# Patient Record
Sex: MALE | Race: WHITE | NOT HISPANIC OR LATINO | URBAN - METROPOLITAN AREA
[De-identification: names, ages, dates, MRNs, and addresses within clinical notes are randomized per-mention and may not be internally consistent; named-entity substitution may affect disease eponyms.]

---

## 2023-09-08 ENCOUNTER — INPATIENT (INPATIENT)
Facility: HOSPITAL | Age: 63
LOS: 2 days | Discharge: HOME | DRG: 246 | End: 2023-09-11
Attending: FAMILY MEDICINE | Admitting: INTERNAL MEDICINE
Payer: COMMERCIAL

## 2023-09-08 ENCOUNTER — EMERGENCY (EMERGENCY)
Facility: HOSPITAL | Age: 63
LOS: 1 days | Discharge: ANOTHER HOSPITAL | End: 2023-09-08
Attending: EMERGENCY MEDICINE | Admitting: EMERGENCY MEDICINE
Payer: COMMERCIAL

## 2023-09-08 VITALS
SYSTOLIC BLOOD PRESSURE: 163 MMHG | OXYGEN SATURATION: 98 % | RESPIRATION RATE: 15 BRPM | HEART RATE: 59 BPM | DIASTOLIC BLOOD PRESSURE: 92 MMHG

## 2023-09-08 VITALS
WEIGHT: 233.91 LBS | OXYGEN SATURATION: 99 % | HEART RATE: 68 BPM | SYSTOLIC BLOOD PRESSURE: 137 MMHG | RESPIRATION RATE: 20 BRPM | DIASTOLIC BLOOD PRESSURE: 98 MMHG

## 2023-09-08 VITALS
SYSTOLIC BLOOD PRESSURE: 162 MMHG | RESPIRATION RATE: 16 BRPM | HEIGHT: 73 IN | TEMPERATURE: 98 F | DIASTOLIC BLOOD PRESSURE: 92 MMHG | OXYGEN SATURATION: 97 % | HEART RATE: 55 BPM | WEIGHT: 235.01 LBS

## 2023-09-08 DIAGNOSIS — I21.3 ST ELEVATION (STEMI) MYOCARDIAL INFARCTION OF UNSPECIFIED SITE: ICD-10-CM

## 2023-09-08 DIAGNOSIS — F17.200 NICOTINE DEPENDENCE, UNSPECIFIED, UNCOMPLICATED: ICD-10-CM

## 2023-09-08 DIAGNOSIS — Z29.9 ENCOUNTER FOR PROPHYLACTIC MEASURES, UNSPECIFIED: ICD-10-CM

## 2023-09-08 DIAGNOSIS — I21.19 ST ELEVATION (STEMI) MYOCARDIAL INFARCTION INVOLVING OTHER CORONARY ARTERY OF INFERIOR WALL: ICD-10-CM

## 2023-09-08 LAB
ALBUMIN SERPL ELPH-MCNC: 4 G/DL — SIGNIFICANT CHANGE UP (ref 3.3–5)
ALP SERPL-CCNC: 95 U/L — SIGNIFICANT CHANGE UP (ref 30–120)
ALT FLD-CCNC: 33 U/L — SIGNIFICANT CHANGE UP (ref 10–60)
ANION GAP SERPL CALC-SCNC: 15 MMOL/L — SIGNIFICANT CHANGE UP (ref 5–17)
APTT BLD: 40.4 SEC — HIGH (ref 24.5–35.6)
AST SERPL-CCNC: 24 U/L — SIGNIFICANT CHANGE UP (ref 10–40)
BASOPHILS # BLD AUTO: 0.1 K/UL — SIGNIFICANT CHANGE UP (ref 0–0.2)
BASOPHILS NFR BLD AUTO: 0.9 % — SIGNIFICANT CHANGE UP (ref 0–2)
BILIRUB SERPL-MCNC: 0.5 MG/DL — SIGNIFICANT CHANGE UP (ref 0.2–1.2)
BUN SERPL-MCNC: 28 MG/DL — HIGH (ref 7–23)
CALCIUM SERPL-MCNC: 9.4 MG/DL — SIGNIFICANT CHANGE UP (ref 8.4–10.5)
CHLORIDE SERPL-SCNC: 103 MMOL/L — SIGNIFICANT CHANGE UP (ref 96–108)
CK MB BLD-MCNC: 13.5 % — HIGH (ref 0–3.5)
CK MB BLD-MCNC: 8.5 % — HIGH (ref 0–3.5)
CK MB CFR SERPL CALC: 42.1 NG/ML — HIGH (ref 0–3.6)
CK MB CFR SERPL CALC: 92.5 NG/ML — HIGH (ref 0–3.6)
CK SERPL-CCNC: 497 U/L — HIGH (ref 26–308)
CK SERPL-CCNC: 684 U/L — HIGH (ref 26–308)
CO2 SERPL-SCNC: 21 MMOL/L — LOW (ref 22–31)
CREAT SERPL-MCNC: 1.09 MG/DL — SIGNIFICANT CHANGE UP (ref 0.5–1.3)
EGFR: 77 ML/MIN/1.73M2 — SIGNIFICANT CHANGE UP
EOSINOPHIL # BLD AUTO: 0.57 K/UL — HIGH (ref 0–0.5)
EOSINOPHIL NFR BLD AUTO: 5.1 % — SIGNIFICANT CHANGE UP (ref 0–6)
GLUCOSE SERPL-MCNC: 118 MG/DL — HIGH (ref 70–99)
HCT VFR BLD CALC: 53 % — HIGH (ref 39–50)
HGB BLD-MCNC: 17.7 G/DL — HIGH (ref 13–17)
IMM GRANULOCYTES NFR BLD AUTO: 0.1 % — SIGNIFICANT CHANGE UP (ref 0–0.9)
INR BLD: 0.94 RATIO — SIGNIFICANT CHANGE UP (ref 0.85–1.18)
LYMPHOCYTES # BLD AUTO: 3.88 K/UL — HIGH (ref 1–3.3)
LYMPHOCYTES # BLD AUTO: 34.7 % — SIGNIFICANT CHANGE UP (ref 13–44)
MCHC RBC-ENTMCNC: 28.4 PG — SIGNIFICANT CHANGE UP (ref 27–34)
MCHC RBC-ENTMCNC: 33.4 GM/DL — SIGNIFICANT CHANGE UP (ref 32–36)
MCV RBC AUTO: 84.9 FL — SIGNIFICANT CHANGE UP (ref 80–100)
MONOCYTES # BLD AUTO: 0.88 K/UL — SIGNIFICANT CHANGE UP (ref 0–0.9)
MONOCYTES NFR BLD AUTO: 7.9 % — SIGNIFICANT CHANGE UP (ref 2–14)
NEUTROPHILS # BLD AUTO: 5.74 K/UL — SIGNIFICANT CHANGE UP (ref 1.8–7.4)
NEUTROPHILS NFR BLD AUTO: 51.3 % — SIGNIFICANT CHANGE UP (ref 43–77)
NRBC # BLD: 0 /100 WBCS — SIGNIFICANT CHANGE UP (ref 0–0)
PLATELET # BLD AUTO: 291 K/UL — SIGNIFICANT CHANGE UP (ref 150–400)
POTASSIUM SERPL-MCNC: 3.8 MMOL/L — SIGNIFICANT CHANGE UP (ref 3.5–5.3)
POTASSIUM SERPL-SCNC: 3.8 MMOL/L — SIGNIFICANT CHANGE UP (ref 3.5–5.3)
PROT SERPL-MCNC: 7.4 G/DL — SIGNIFICANT CHANGE UP (ref 6–8.3)
PROTHROM AB SERPL-ACNC: 10.5 SEC — SIGNIFICANT CHANGE UP (ref 9.5–13)
RBC # BLD: 6.24 M/UL — HIGH (ref 4.2–5.8)
RBC # FLD: 13.4 % — SIGNIFICANT CHANGE UP (ref 10.3–14.5)
SODIUM SERPL-SCNC: 139 MMOL/L — SIGNIFICANT CHANGE UP (ref 135–145)
TROPONIN I, HIGH SENSITIVITY RESULT: 16.2 NG/L — SIGNIFICANT CHANGE UP
TROPONIN I, HIGH SENSITIVITY RESULT: HIGH NG/L
TROPONIN I, HIGH SENSITIVITY RESULT: HIGH NG/L
WBC # BLD: 11.18 K/UL — HIGH (ref 3.8–10.5)
WBC # FLD AUTO: 11.18 K/UL — HIGH (ref 3.8–10.5)

## 2023-09-08 PROCEDURE — 96374 THER/PROPH/DIAG INJ IV PUSH: CPT

## 2023-09-08 PROCEDURE — 85610 PROTHROMBIN TIME: CPT

## 2023-09-08 PROCEDURE — 99291 CRITICAL CARE FIRST HOUR: CPT

## 2023-09-08 PROCEDURE — 93010 ELECTROCARDIOGRAM REPORT: CPT

## 2023-09-08 PROCEDURE — 80053 COMPREHEN METABOLIC PANEL: CPT

## 2023-09-08 PROCEDURE — 84484 ASSAY OF TROPONIN QUANT: CPT

## 2023-09-08 PROCEDURE — 92978 ENDOLUMINL IVUS OCT C 1ST: CPT | Mod: 26,RC

## 2023-09-08 PROCEDURE — 99152 MOD SED SAME PHYS/QHP 5/>YRS: CPT

## 2023-09-08 PROCEDURE — 71045 X-RAY EXAM CHEST 1 VIEW: CPT

## 2023-09-08 PROCEDURE — 99223 1ST HOSP IP/OBS HIGH 75: CPT | Mod: GC

## 2023-09-08 PROCEDURE — 36415 COLL VENOUS BLD VENIPUNCTURE: CPT

## 2023-09-08 PROCEDURE — 93005 ELECTROCARDIOGRAM TRACING: CPT

## 2023-09-08 PROCEDURE — 85730 THROMBOPLASTIN TIME PARTIAL: CPT

## 2023-09-08 PROCEDURE — 99291 CRITICAL CARE FIRST HOUR: CPT | Mod: 25

## 2023-09-08 PROCEDURE — 71045 X-RAY EXAM CHEST 1 VIEW: CPT | Mod: 26

## 2023-09-08 PROCEDURE — 85025 COMPLETE CBC W/AUTO DIFF WBC: CPT

## 2023-09-08 PROCEDURE — 92941 PRQ TRLML REVSC TOT OCCL AMI: CPT | Mod: RC

## 2023-09-08 RX ORDER — TICAGRELOR 90 MG/1
90 TABLET ORAL EVERY 12 HOURS
Refills: 0 | Status: DISCONTINUED | OUTPATIENT
Start: 2023-09-09 | End: 2023-09-10

## 2023-09-08 RX ORDER — SODIUM CHLORIDE 9 MG/ML
500 INJECTION INTRAMUSCULAR; INTRAVENOUS; SUBCUTANEOUS
Refills: 0 | Status: DISCONTINUED | OUTPATIENT
Start: 2023-09-08 | End: 2023-09-09

## 2023-09-08 RX ORDER — TICAGRELOR 90 MG/1
90 TABLET ORAL ONCE
Refills: 0 | Status: COMPLETED | OUTPATIENT
Start: 2023-09-08 | End: 2023-09-08

## 2023-09-08 RX ORDER — LISINOPRIL 2.5 MG/1
5 TABLET ORAL DAILY
Refills: 0 | Status: DISCONTINUED | OUTPATIENT
Start: 2023-09-09 | End: 2023-09-11

## 2023-09-08 RX ORDER — NICOTINE POLACRILEX 2 MG
1 GUM BUCCAL DAILY
Refills: 0 | Status: DISCONTINUED | OUTPATIENT
Start: 2023-09-08 | End: 2023-09-11

## 2023-09-08 RX ORDER — HEPARIN SODIUM 5000 [USP'U]/ML
5000 INJECTION INTRAVENOUS; SUBCUTANEOUS ONCE
Refills: 0 | Status: DISCONTINUED | OUTPATIENT
Start: 2023-09-08 | End: 2023-09-08

## 2023-09-08 RX ORDER — TICAGRELOR 90 MG/1
180 TABLET ORAL ONCE
Refills: 0 | Status: COMPLETED | OUTPATIENT
Start: 2023-09-08 | End: 2023-09-08

## 2023-09-08 RX ORDER — METOPROLOL TARTRATE 50 MG
12.5 TABLET ORAL DAILY
Refills: 0 | Status: DISCONTINUED | OUTPATIENT
Start: 2023-09-08 | End: 2023-09-09

## 2023-09-08 RX ORDER — POTASSIUM CHLORIDE 20 MEQ
40 PACKET (EA) ORAL EVERY 4 HOURS
Refills: 0 | Status: COMPLETED | OUTPATIENT
Start: 2023-09-08 | End: 2023-09-08

## 2023-09-08 RX ORDER — HEPARIN SODIUM 5000 [USP'U]/ML
4000 INJECTION INTRAVENOUS; SUBCUTANEOUS ONCE
Refills: 0 | Status: COMPLETED | OUTPATIENT
Start: 2023-09-08 | End: 2023-09-08

## 2023-09-08 RX ORDER — ATORVASTATIN CALCIUM 80 MG/1
80 TABLET, FILM COATED ORAL AT BEDTIME
Refills: 0 | Status: DISCONTINUED | OUTPATIENT
Start: 2023-09-08 | End: 2023-09-11

## 2023-09-08 RX ORDER — ASPIRIN/CALCIUM CARB/MAGNESIUM 324 MG
324 TABLET ORAL ONCE
Refills: 0 | Status: COMPLETED | OUTPATIENT
Start: 2023-09-08 | End: 2023-09-08

## 2023-09-08 RX ORDER — ASPIRIN/CALCIUM CARB/MAGNESIUM 324 MG
81 TABLET ORAL DAILY
Refills: 0 | Status: DISCONTINUED | OUTPATIENT
Start: 2023-09-09 | End: 2023-09-11

## 2023-09-08 RX ORDER — INFLUENZA VIRUS VACCINE 15; 15; 15; 15 UG/.5ML; UG/.5ML; UG/.5ML; UG/.5ML
0.5 SUSPENSION INTRAMUSCULAR ONCE
Refills: 0 | Status: DISCONTINUED | OUTPATIENT
Start: 2023-09-08 | End: 2023-09-11

## 2023-09-08 RX ADMIN — SODIUM CHLORIDE 75 MILLILITER(S): 9 INJECTION INTRAMUSCULAR; INTRAVENOUS; SUBCUTANEOUS at 15:01

## 2023-09-08 RX ADMIN — Medication 1 PATCH: at 20:19

## 2023-09-08 RX ADMIN — TICAGRELOR 90 MILLIGRAM(S): 90 TABLET ORAL at 21:12

## 2023-09-08 RX ADMIN — Medication 40 MILLIEQUIVALENT(S): at 15:03

## 2023-09-08 RX ADMIN — Medication 324 MILLIGRAM(S): at 11:31

## 2023-09-08 RX ADMIN — ATORVASTATIN CALCIUM 80 MILLIGRAM(S): 80 TABLET, FILM COATED ORAL at 21:12

## 2023-09-08 RX ADMIN — TICAGRELOR 180 MILLIGRAM(S): 90 TABLET ORAL at 11:40

## 2023-09-08 RX ADMIN — Medication 40 MILLIEQUIVALENT(S): at 17:16

## 2023-09-08 RX ADMIN — Medication 1 PATCH: at 17:14

## 2023-09-08 RX ADMIN — HEPARIN SODIUM 4000 UNIT(S): 5000 INJECTION INTRAVENOUS; SUBCUTANEOUS at 11:46

## 2023-09-08 NOTE — ED ADULT NURSE NOTE - OBJECTIVE STATEMENT
61 yo M w/ no known PMHx presents to ED via walk in c/o chest pain/tightness x45 mins. Pt reports he was at work when pain began, denies any other symptoms. Pt denies any SOB, cough, N/V, fever, chills, urinary complaints, constipation, diarrhea, HA, dizziness, weakness. Pt A&Ox4, lungs CTA, +central pulses. Abdomen soft, not tender, not distended. Ambulating w/ steady gait, safety and comfort maintained, no acute distress noted at this time. Pt denies any recent travel or known sick contacts.   EKG noted for STEMI. MD aware at bedside for pt eval. Transport arranged to [facility] for intervention.

## 2023-09-08 NOTE — ED PROVIDER NOTE - NS ED ATTENDING STATEMENT MOD
This was a shared visit with the MIGUEL A. I reviewed and verified the documentation and independently performed the documented: I have personally provided the amount of critical care time documented below excluding time spent on separate procedures.

## 2023-09-08 NOTE — CONSULT NOTE ADULT - ASSESSMENT
61 y/o M active smoker presented to [place] ED with midsternal chest pain and was transferred to the [facility] s/p asa 324mg, heparin 5000u, brilinta, for urgent PCI. Patient was found to have acute inferior wall STEMI and underwent cardiac cath with placement of *** stents.   61 y/o M active smoker presented to [place] ED with midsternal chest pain and was transferred to the [facility] s/p asa 324mg, heparin 5000u, brilinta, for urgent PCI. Patient was found to have acute inferior wall STEMI and underwent cardiac cath with placement of 2 REBEKAH to dRCA stents.

## 2023-09-08 NOTE — CONSULT NOTE ADULT - PROBLEM SELECTOR RECOMMENDATION 3
VTE ppx: per primary team/MICU  - encourage ambulation following band removal as tolerated VTE ppx: per primary team/MICU  - hold pharmacological vte ppx per int cardio team  - encourage ambulation 30 min following band removal as tolerated

## 2023-09-08 NOTE — CONSULT NOTE ADULT - SUBJECTIVE AND OBJECTIVE BOX
History of Present Illness:    Past Medical/Surgical History:    Medications:    Family History: Non-contributory family history of premature cardiovascular atherosclerotic disease    Social History: No tobacco, alcohol or drug use    Review of Systems:  General: No fevers, chills, weight gain  Skin: No rashes, color changes  Cardiovascular: No chest pain, orthopnea  Respiratory: No shortness of breath, cough  Gastrointestinal: No nausea, abdominal pain  Genitourinary: No incontinence, pain with urination  Musculoskeletal: No pain, swelling, decreased range of motion  Neurological: No headache, weakness  Psychiatric: No depression, anxiety  Endocrine: No weight gain, increased thirst  All other systems are comprehensively negative.    Physical Exam:  Vitals:        Vital Signs Last 24 Hrs  T(C): 36.6 (08 Sep 2023 11:23), Max: 36.6 (08 Sep 2023 11:23)  T(F): 97.9 (08 Sep 2023 11:23), Max: 97.9 (08 Sep 2023 11:23)  HR: 55 (08 Sep 2023 11:23) (55 - 55)  BP: 162/92 (08 Sep 2023 11:23) (162/92 - 162/92)  BP(mean): --  RR: 16 (08 Sep 2023 11:23) (16 - 16)  SpO2: 97% (08 Sep 2023 11:23) (97% - 97%)    Parameters below as of 08 Sep 2023 11:23  Patient On (Oxygen Delivery Method): room air      General: NAD  HEENT: MMM  Neck: No JVD, no carotid bruit  Lungs: CTAB  CV: RRR, nl S1/S2, no M/R/G  Abdomen: S/NT/ND, +BS  Extremities: No LE edema, no cyanosis  Neuro: AAOx3, non-focal  Skin: No rash    Labs:                  ECG/Telemetry:      History of Present Illness: The patient is a 62 year old male with no known past medical history who presents with chest pain. He states chest pain began about 20 min ago. It is substernal tightness. No radiation. No associated shortness of breath, dizziness, palpitations. No prior chest pain. He has not seen a doctor in years.    Past Medical/Surgical History:  None    Medications:  None    Family History: Father - CAD    Social History: +tobacco use    Review of Systems:  General: No fevers, chills, weight gain  Skin: No rashes, color changes  Cardiovascular: +chest pain, orthopnea  Respiratory: No shortness of breath, cough  Gastrointestinal: No nausea, abdominal pain  Genitourinary: No incontinence, pain with urination  Musculoskeletal: No pain, swelling, decreased range of motion  Neurological: No headache, weakness  Psychiatric: No depression, anxiety  Endocrine: No weight gain, increased thirst  All other systems are comprehensively negative.    Physical Exam:  Vitals:        Vital Signs Last 24 Hrs  T(C): 36.6 (08 Sep 2023 11:23), Max: 36.6 (08 Sep 2023 11:23)  T(F): 97.9 (08 Sep 2023 11:23), Max: 97.9 (08 Sep 2023 11:23)  HR: 55 (08 Sep 2023 11:23) (55 - 55)  BP: 162/92 (08 Sep 2023 11:23) (162/92 - 162/92)  BP(mean): --  RR: 16 (08 Sep 2023 11:23) (16 - 16)  SpO2: 97% (08 Sep 2023 11:23) (97% - 97%)    Parameters below as of 08 Sep 2023 11:23  Patient On (Oxygen Delivery Method): room air      General: NAD  HEENT: MMM  Neck: No JVD, no carotid bruit  Lungs: CTAB  CV: RRR, nl S1/S2, no M/R/G  Abdomen: S/NT/ND, +BS  Extremities: No LE edema, no cyanosis  Neuro: AAOx3, non-focal  Skin: No rash    Labs:  Pending    ECG/Telemetry: NSR, inferior STEMI with reciprocal lateral depressions

## 2023-09-08 NOTE — CONSULT NOTE ADULT - SUBJECTIVE AND OBJECTIVE BOX
Patient is a 62y old  Male who presents with a chief complaint of STEMI (08 Sep 2023 13:38)        PAST MEDICAL & SURGICAL HISTORY:  No pertinent past medical history          Review of Systems:  Negative      Medications:    metoprolol succinate ER 12.5 milliGRAM(s) Oral daily          ticagrelor 90 milliGRAM(s) Oral once        atorvastatin 80 milliGRAM(s) Oral at bedtime    sodium chloride 0.9%. 500 milliLiter(s) IV Continuous <Continuous>    influenza   Vaccine 0.5 milliLiter(s) IntraMuscular once      nicotine - 21 mG/24Hr(s) Patch 1 Patch Transdermal daily          ICU Vital Signs Last 24 Hrs  T(C): 36.6 (08 Sep 2023 17:00), Max: 36.6 (08 Sep 2023 11:23)  T(F): 97.8 (08 Sep 2023 17:00), Max: 97.9 (08 Sep 2023 11:23)  HR: 45 (08 Sep 2023 17:00) (44 - 68)  BP: 146/83 (08 Sep 2023 17:00) (119/76 - 163/92)  BP(mean): 109 (08 Sep 2023 17:00) (94 - 113)  ABP: --  ABP(mean): --  RR: 18 (08 Sep 2023 17:00) (15 - 29)  SpO2: 95% (08 Sep 2023 17:00) (93% - 99%)    O2 Parameters below as of 08 Sep 2023 14:00  Patient On (Oxygen Delivery Method): room air                I&O's Detail    08 Sep 2023 07:01  -  08 Sep 2023 17:47  --------------------------------------------------------  IN:    Oral Fluid: 500 mL    sodium chloride 0.9%: 225 mL  Total IN: 725 mL    OUT:    Voided (mL): 0 mL  Total OUT: 0 mL    Total NET: 725 mL            LABS:                        17.7   11.18 )-----------( 291      ( 08 Sep 2023 11:32 )             53.0     09-08    139  |  103  |  28<H>  ----------------------------<  118<H>  3.8   |  21<L>  |  1.09    Ca    9.4      08 Sep 2023 11:32    TPro  7.4  /  Alb  4.0  /  TBili  0.5  /  DBili  x   /  AST  24  /  ALT  33  /  AlkPhos  95  09-08      CARDIAC MARKERS ( 08 Sep 2023 13:00 )  x     / x     / 497 U/L / x     / 42.1 ng/mL      CAPILLARY BLOOD GLUCOSE        PT/INR - ( 08 Sep 2023 11:32 )   PT: 10.5 sec;   INR: 0.94 ratio         PTT - ( 08 Sep 2023 11:32 )  PTT:40.4 sec  Urinalysis Basic - ( 08 Sep 2023 11:32 )    Color: x / Appearance: x / SG: x / pH: x  Gluc: 118 mg/dL / Ketone: x  / Bili: x / Urobili: x   Blood: x / Protein: x / Nitrite: x   Leuk Esterase: x / RBC: x / WBC x   Sq Epi: x / Non Sq Epi: x / Bacteria: x      CULTURES:      Physical Examination:    General: No acute distress.      HEENT: Pupils equal, reactive to light.  Symmetric.    PULM: CTABL    CVS: Bradycardia. Regular rhythm    ABD: Soft, nondistended, nontender, normoactive bowel sounds, no masses    EXT: No edema. R radial band with bruising     SKIN: Warm and well perfused, no rashes noted.    NEURO: Alert, NFD.      < from: Xray Chest 1 View- PORTABLE-Urgent (09.08.23 @ 11:35) >  COMPARISON: None available.    Heartsize is within normal limits.    Lungs are clear.    IMPRESSION: Negative chest.    --- End of Report ---            ALICE CALDERON MD; Attending Radiologist  This document has been electronically signed. Sep  8 2023  3:32PM    < end of copied text >

## 2023-09-08 NOTE — H&P CARDIOLOGY - HISTORY OF PRESENT ILLNESS
This is a 62 year old gentleman with unknown PMH ( has not been to a doctor in years) presenting to the [place] ED after experiencing 25 min of ongoing midsternal chest pain while weeding. Pt is a . Pt transported to [facility] ED to Cardiac Cath Lab for urgent PCI/ EKG demonstrates an Acute IWMI S-T- elevations in leads 2,3,and AVFwith posterior S-T depressions. Pt received  mg po, Heparin 5,000 units IV, and Brilinta 180 mg po in [place] ED. Pt last ate muffins at 7am this morning. Informed consent obtained prior to procedure.    ASA-2  MALL-2  eGFR-77  Creat-1.09  BRA-0.8%      This is a 62 year old gentleman with unknown PMH ( has not been to a doctor in years) takes no medications, + 1.5 PPD everyday smoker x 40 yrs. presenting to the [place] ED after experiencing 25 min of ongoing midsternal chest pain while weeding. Pt is a . Pt transported to [facility] ED to Cardiac Cath Lab for urgent PCI/ EKG demonstrates an Acute IWMI S-T- elevations in leads 2,3,and AVF with posterior S-T depressions. Pt received  mg po, Heparin 5,000 units IV, and Brilinta 180 mg po in [place] ED. Pt last ate muffins at 7am this morning. Informed consent obtained prior to procedure.    ASA-2  MALL-2  eGFR-77  Creat-1.09  BRA-0.8%

## 2023-09-08 NOTE — PATIENT PROFILE ADULT - FALL HARM RISK - HARM RISK INTERVENTIONS

## 2023-09-08 NOTE — ED PROVIDER NOTE - CLINICAL SUMMARY MEDICAL DECISION MAKING FREE TEXT BOX
Patient complaining 15 minutes of substernal chest pressure.  EKG reflects STEMI will get chest x-ray labs give aspirin Brilinta heparin and transfer to [facility] for cardiac cath

## 2023-09-08 NOTE — H&P CARDIOLOGY - REVIEW OF SYSTEMS
General: No fatigue, no fevers/chills  Respiratory: No dyspnea, no cough, no wheeze  CV: sl  chest pain 1/10 , no palpitations  Abd: No nausea  Neuro: No headache, no dizziness

## 2023-09-08 NOTE — ED ADULT TRIAGE NOTE - STATUS:
Applied
You can access the FollowMyHealth Patient Portal offered by [facility] by registering at the following website: [email or web address]. By joining [facility]’s FollowMyHealth portal, you will also be able to view your health information using other applications (apps) compatible with our system.

## 2023-09-08 NOTE — CONSULT NOTE ADULT - SUBJECTIVE AND OBJECTIVE BOX
Patient is a 62y old  Male who presents with a chief complaint of IWMI/ STEMI (08 Sep 2023 13:38)    FROM ADMISSION H+P:   HPI: This is a 62 year old gentleman with unknown PMH ( has not been to a doctor in years) takes no medications, + 1.5 PPD everyday smoker x 40 yrs. presenting to the [place] ED after experiencing 25 min of ongoing midsternal chest pain while weeding. Pt is a . Pt transported to [facility] ED to Cardiac Cath Lab for urgent PCI/ EKG demonstrates an Acute IWMI S-T- elevations in leads 2,3,and AVF with posterior S-T depressions. Pt received  mg po, Heparin 5,000 units IV, and Brilinta 180 mg po in [place] ED. Pt last ate muffins at 7am this morning. Informed consent obtained prior to procedure.    ASA-2  MALL-2  eGFR-77  Creat-1.09  BRA-0.8% (08 Sep 2023 12:18)      ----  INTERVAL HPI/OVERNIGHT EVENTS: Pt seen and evaluated at the bedside. Patient states ****.      ----  PAST MEDICAL & SURGICAL HISTORY:  No pertinent past medical history    ----  Home medications:    ----  FAMILY HISTORY:      ----  Allergies    No Known Allergies    Intolerances        ----  SOCIAL HISTORY:  Lives:  ADLs:  Diet:  Vaccination:  Occupation:  Alcohol Use:  Tobacco Use: active smoker, 1.5ppd for 40 years  Recreational Drug Use:    ----  REVIEW OF SYSTEMS:  Constitutional: denies fever, chills, diaphoresis  HEENT: denies sore throat, runny nose, blurry vision, double vision    Respiratory: denies SOB, DAMIAN, cough, wheezing, hemoptysis  Cardiovascular: denies CP, palpitations, LE edema  Gastrointestinal:  denies nausea, vomiting, diarrhea, constipation, abdominal pain, melena, hematochezia   Genitourinary: denies dysuria, hematuria  Skin/Breast: denies rash, hives, itching   Musculoskeletal: denies myalgias, arthritis, joint swelling, muscle weakness  Neurologic: denies syncope, LOC, headache, weakness, dizziness     ----  PHYSICAL EXAM:  gen: appears stated age, NAD  heent: nc/at, eomi, perrla, mmm  neck: supple  resp: cta b/l, no wheezes, rales or rhonchi  cardiac: +s1, s2, RRR, no murmurs appreciated  abd: soft, nt, nd, no rebound/guarding  mskt: no clubbing, cyanosis or edema of LE extremities  vasc: 2+ radial pulses  skin: warm and dry  neuro: a&ox3, no appreciable focal deficits   psych: normal mood, affect. normal behavior    T(C): 36.4 (09-08-23 @ 14:20), Max: 36.6 (09-08-23 @ 11:23)  HR: 62 (09-08-23 @ 14:20) (55 - 68)  BP: 128/72 (09-08-23 @ 14:20) (126/78 - 163/92)  RR: 26 (09-08-23 @ 14:20) (15 - 26)  SpO2: 96% (09-08-23 @ 14:20) (93% - 99%)  Wt(kg): --    ----  I&O's Summary      LABS:                        17.7   11.18 )-----------( 291      ( 08 Sep 2023 11:32 )             53.0     09-08    139  |  103  |  28<H>  ----------------------------<  118<H>  3.8   |  21<L>  |  1.09    Ca    9.4      08 Sep 2023 11:32    TPro  7.4  /  Alb  4.0  /  TBili  0.5  /  DBili  x   /  AST  24  /  ALT  33  /  AlkPhos  95  09-08    PT/INR - ( 08 Sep 2023 11:32 )   PT: 10.5 sec;   INR: 0.94 ratio         PTT - ( 08 Sep 2023 11:32 )  PTT:40.4 sec  Urinalysis Basic - ( 08 Sep 2023 11:32 )    Color: x / Appearance: x / SG: x / pH: x  Gluc: 118 mg/dL / Ketone: x  / Bili: x / Urobili: x   Blood: x / Protein: x / Nitrite: x   Leuk Esterase: x / RBC: x / WBC x   Sq Epi: x / Non Sq Epi: x / Bacteria: x    ----  Personally reviewed:  Vital sign trends: [ x ] yes    [  ] no     [  ] n/a  Laboratory results: [ x ] yes    [  ] no     [  ] n/a  Radiology results: [  ] yes    [  ] no     [  ] n/a  Culture results: [  ] yes    [  ] no     [ x ] n/a  Consultant recommendations: [ x ] yes    [  ] no     [  ] n/a         Patient is a 62y old  Male who presents with a chief complaint of IWMI/ STEMI (08 Sep 2023 13:38)    FROM ADMISSION H+P:   HPI: This is a 62 year old gentleman with unknown PMH ( has not been to a doctor in years) takes no medications, + 1.5 PPD everyday smoker x 40 yrs. presenting to the [place] ED after experiencing 25 min of ongoing midsternal chest pain while weeding. Pt is a . Pt transported to [facility] ED to Cardiac Cath Lab for urgent PCI/ EKG demonstrates an Acute IWMI S-T- elevations in leads 2,3,and AVF with posterior S-T depressions. Pt received  mg po, Heparin 5,000 units IV, and Brilinta 180 mg po in [place] ED. Pt last ate muffins at 7am this morning. Informed consent obtained prior to procedure.    ASA-2  MALL-2  eGFR-77  Creat-1.09  BRA-0.8% (08 Sep 2023 12:18)      ----  INTERVAL HPI/OVERNIGHT EVENTS: Pt seen and evaluated at the bedside. Patient states he feels better than before. Denies cp, palpitations, sob.      ----  PAST MEDICAL & SURGICAL HISTORY:  No pertinent past medical history    ----  Home medications:  *No home medications    ----  FAMILY HISTORY:      ----  Allergies    No Known Allergies    Intolerances        ----  SOCIAL HISTORY:  ADLs: independent  Diet: regular w/o restriction (DASH)  Alcohol Use: denies   Tobacco Use: active smoker, 1.5ppd for 40 years  Recreational Drug Use: denies    ----  REVIEW OF SYSTEMS:  Constitutional: denies fever, chills, diaphoresis  HEENT: denies sore throat, runny nose, blurry vision, double vision    Respiratory: denies SOB, DAMIAN, cough, wheezing, hemoptysis  Cardiovascular: denies CP, palpitations, LE edema  Gastrointestinal:  denies nausea, vomiting, diarrhea, constipation, abdominal pain, melena, hematochezia   Genitourinary: denies dysuria, hematuria  Skin/Breast: denies rash, hives, itching   Musculoskeletal: denies myalgias, arthritis, joint swelling, muscle weakness  Neurologic: denies syncope, LOC, headache, weakness, dizziness     ----  PHYSICAL EXAM:  gen: appears stated age, NAD  heent: nc/at, eomi, perrla, mmm  neck: supple  resp: cta b/l, no wheezes, rales or rhonchi  cardiac: +s1, s2, RRR, no murmurs appreciated  abd: soft, nt, nd, no rebound/guarding  mskt: no clubbing, cyanosis or edema of LE extremities  vasc: 2+ radial pulses  skin: warm and dry  neuro: a&ox3, no appreciable focal deficits   psych: normal mood, affect. normal behavior    T(C): 36.4 (09-08-23 @ 14:20), Max: 36.6 (09-08-23 @ 11:23)  HR: 62 (09-08-23 @ 14:20) (55 - 68)  BP: 128/72 (09-08-23 @ 14:20) (126/78 - 163/92)  RR: 26 (09-08-23 @ 14:20) (15 - 26)  SpO2: 96% (09-08-23 @ 14:20) (93% - 99%)  Wt(kg): --    ----  I&O's Summary      LABS:                        17.7   11.18 )-----------( 291      ( 08 Sep 2023 11:32 )             53.0     09-08    139  |  103  |  28<H>  ----------------------------<  118<H>  3.8   |  21<L>  |  1.09    Ca    9.4      08 Sep 2023 11:32    TPro  7.4  /  Alb  4.0  /  TBili  0.5  /  DBili  x   /  AST  24  /  ALT  33  /  AlkPhos  95  09-08    PT/INR - ( 08 Sep 2023 11:32 )   PT: 10.5 sec;   INR: 0.94 ratio         PTT - ( 08 Sep 2023 11:32 )  PTT:40.4 sec  Urinalysis Basic - ( 08 Sep 2023 11:32 )    Color: x / Appearance: x / SG: x / pH: x  Gluc: 118 mg/dL / Ketone: x  / Bili: x / Urobili: x   Blood: x / Protein: x / Nitrite: x   Leuk Esterase: x / RBC: x / WBC x   Sq Epi: x / Non Sq Epi: x / Bacteria: x    ----  Personally reviewed:  Vital sign trends: [ x ] yes    [  ] no     [  ] n/a  Laboratory results: [ x ] yes    [  ] no     [  ] n/a  Radiology results: [  ] yes    [  ] no     [ x ] n/a  Culture results: [  ] yes    [  ] no     [ x ] n/a  Consultant recommendations: [ x ] yes    [  ] no     [  ] n/a         Patient is a 62y old  Male who presents with a chief complaint of IWMI/ STEMI (08 Sep 2023 13:38)    FROM ADMISSION H+P:   HPI: This is a 62 year old gentleman with unknown PMH ( has not been to a doctor in years) takes no medications, + 1.5 PPD everyday smoker x 40 yrs. presenting to the [place] ED after experiencing 25 min of ongoing midsternal chest pain while weeding. Pt is a . Pt transported to [facility] ED to Cardiac Cath Lab for urgent PCI/ EKG demonstrates an Acute IWMI S-T- elevations in leads 2,3,and AVF with posterior S-T depressions. Pt received  mg po, Heparin 5,000 units IV, and Brilinta 180 mg po in [place] ED. Pt last ate muffins at 7am this morning. Informed consent obtained prior to procedure.    ASA-2  MALL-2  eGFR-77  Creat-1.09  BRA-0.8% (08 Sep 2023 12:18)      ----  INTERVAL HPI/OVERNIGHT EVENTS: Pt seen and evaluated at the bedside. Patient states he feels better than before. Denies cp, palpitations, sob.      ----  PAST MEDICAL & SURGICAL HISTORY:  No pertinent past medical history    ----  Home medications:  *No home medications    ----  FAMILY HISTORY:  Family hx: father had heart attack at age 60    ----  Allergies    No Known Allergies          ----  SOCIAL HISTORY:  ADLs: independent  Diet: regular w/o restriction (DASH)  Alcohol Use: denies   Tobacco Use: active smoker, 1.5ppd for 40 years  Recreational Drug Use: denies    ----  REVIEW OF SYSTEMS:  Constitutional: denies fever, chills, diaphoresis  HEENT: denies sore throat, runny nose, blurry vision, double vision    Respiratory: denies SOB, DAMIAN, cough, wheezing, hemoptysis  Cardiovascular: denies CP, palpitations, LE edema  Gastrointestinal:  denies nausea, vomiting, diarrhea, constipation, abdominal pain, melena, hematochezia   Genitourinary: denies dysuria, hematuria  Skin/Breast: denies rash, hives, itching   Musculoskeletal: denies myalgias, arthritis, joint swelling, muscle weakness  Neurologic: denies syncope, LOC, headache, weakness, dizziness     ----  PHYSICAL EXAM:  gen: appears stated age, NAD  heent: nc/at, eomi, perrla, mmm  neck: supple  resp: cta b/l, no wheezes, rales or rhonchi  cardiac: +s1, s2, RRR, no murmurs appreciated  abd: soft, nt, nd, no rebound/guarding  mskt: no clubbing, cyanosis or edema of LE extremities  vasc: 2+ radial pulses  skin: warm and dry  neuro: a&ox3, no appreciable focal deficits   psych: normal mood, affect. normal behavior    T(C): 36.4 (09-08-23 @ 14:20), Max: 36.6 (09-08-23 @ 11:23)  HR: 62 (09-08-23 @ 14:20) (55 - 68)  BP: 128/72 (09-08-23 @ 14:20) (126/78 - 163/92)  RR: 26 (09-08-23 @ 14:20) (15 - 26)  SpO2: 96% (09-08-23 @ 14:20) (93% - 99%)  Wt(kg): --    ----  I&O's Summary      LABS:                        17.7   11.18 )-----------( 291      ( 08 Sep 2023 11:32 )             53.0     09-08    139  |  103  |  28<H>  ----------------------------<  118<H>  3.8   |  21<L>  |  1.09    Ca    9.4      08 Sep 2023 11:32    TPro  7.4  /  Alb  4.0  /  TBili  0.5  /  DBili  x   /  AST  24  /  ALT  33  /  AlkPhos  95  09-08    PT/INR - ( 08 Sep 2023 11:32 )   PT: 10.5 sec;   INR: 0.94 ratio         PTT - ( 08 Sep 2023 11:32 )  PTT:40.4 sec  Urinalysis Basic - ( 08 Sep 2023 11:32 )    Color: x / Appearance: x / SG: x / pH: x  Gluc: 118 mg/dL / Ketone: x  / Bili: x / Urobili: x   Blood: x / Protein: x / Nitrite: x   Leuk Esterase: x / RBC: x / WBC x   Sq Epi: x / Non Sq Epi: x / Bacteria: x

## 2023-09-08 NOTE — ED PROVIDER NOTE - PROGRESS NOTE DETAILS
d/w demetris (interventional cards at [facility]) he will accept xfer to [facility] for cath, agrees with asa brillinta heparin. called xfer center

## 2023-09-08 NOTE — ED ADULT NURSE NOTE - NS_ED_NURSE_RECEIVING_FACILITY _ED_ALL_ED
[facility]

## 2023-09-08 NOTE — ED ADULT NURSE NOTE - NS TRANSFER PATIENT BELONGINGS
cell phone/Wrist Watch/Cell Phone/PDA (specify)/Electronic Device (specify)/Clothing cell phone, work boots/Cell Phone/PDA (specify)/Clothing

## 2023-09-08 NOTE — CONSULT NOTE ADULT - PROBLEM SELECTOR RECOMMENDATION 2
Pt actively smokers, 1.5 ppd x 40 years  - tobacco cessation counseling provided  - *** nicotine patch Pt actively smokers, 1.5 ppd x 40 years  - tobacco cessation counseling provided  - nicotine patch 21 mg / daily ordered

## 2023-09-08 NOTE — CONSULT NOTE ADULT - ASSESSMENT
This is a 62 year old gentleman with unknown PMH ( has not been to a doctor in years) takes no medications, + 1.5 PPD everyday smoker x 40 yrs. presenting to the [place] ED after experiencing 25 min of ongoing midsternal chest pain while weeding. Pt is a . Pt transported to [facility] ED to Cardiac Cath Lab for urgent PCI/ EKG demonstrates an Acute IWMI S-T- elevations in leads 2,3,and AVF with posterior S-T depressions. Pt received  mg po, Heparin 5,000 units IV, and Brilinta 180 mg po in [place] ED. Pt last ate muffins at 7am this morning. Informed consent obtained prior to procedure.      Assessment    1) STEMI  2) Bradycardia     Plan    Patient developed CP at work. Found to have an inferior wall STEMI at [facility] then transferred to [facility] hospital.   % occluded. DESx2 placed  Post cath nursing care (Radial site C/D/I without active bleeding)   EKG shows no ischemia. Repeat in AM.   Pending TTE  Increased trops (expected). Will trend  High dose Statin ordered  ASA/Brillinta  Holding Beta blocker this evening in the setting of asymptomatic bradycardia  ACEi ordered.   CXR clean  Will titrate agents up for maximal GDMT if able  Pending Lipids and HgA1c  Cardiology and IR cardiology following  Needs PCP and lifestyle counselling        Time spent on this patient encounter, which includes documenting this note in the electronic medical record, was 60 minutes including assessing the presenting problems with associated risks, reviewing the medical record to prepare for the encounter, and meeting face to face with patient to obtain additional history. I have also performed an appropriate physical exam, made interventions listed and ordered and interpreted appropriate diagnostic studies as documented. To improve communication and patient saftey, I have coordinated care with the multidisciplinary team including the bedside nurse, appropriate attending of record and consultants as needed.

## 2023-09-08 NOTE — PATIENT PROFILE ADULT - [id number]_GEN_A_NUR
COVID-19 PCR test completed. Patient handout For Patients Who Have Been Tested for Covid-19 (Coronavirus) was given to the patient, which includes test result notification process.   Learning behavioral activities to cope with urges.  For example, distraction and changing routines

## 2023-09-08 NOTE — CONSULT NOTE ADULT - ATTENDING COMMENTS
61 y/o M active smoker presented to [place] ED with midsternal chest pain and was transferred to the [place] Cardiac Cath Lab s/p asa 324mg, heparin 5000u, brilinta, for urgent PCI. Patient was found to have acute inferior wall STEMI and underwent cardiac cath with placement of 2 REBEKAH to dRCA stents.    HPI as above.   Patient seen post cath. Feels much better. Denies headaches, nausea, vomiting, chest pain, SOB, palpitations, abdominal pain, constipation, diarrhea, melena, hematochezia, dysuria.     T(C): 36.5 (09-08-23 @ 15:30), Max: 36.6 (09-08-23 @ 11:23)  HR: 49 (09-08-23 @ 15:30) (49 - 68)  BP: 136/79 (09-08-23 @ 15:30) (119/76 - 163/92)  RR: 29 (09-08-23 @ 15:30) (15 - 29)  SpO2: 96% (09-08-23 @ 15:30) (93% - 99%)  Wt(kg): --    Physical Exam:   GENERAL: well-groomed, well-developed, NAD  HEENT: head NC/AT; EOM intact, PERRLA, conjunctiva & sclera clear; hearing grossly intact, moist mucous membranes  NECK: supple, no JVD  RESPIRATORY: CTA B/L, no wheezing, rales, rhonchi or rubs  CARDIOVASCULAR: S1&S2, RRR, no murmurs or gallops  ABDOMEN: soft, non-tender, non-distended, + Bowel sounds x4 quadrants, no guarding, rebound or rigidity  MUSCULOSKELETAL:  no clubbing, cyanosis or edema of all 4 extremities  LYMPH: no cervical lymphadenopathy  VASCULAR:Right radial site without hematoma  SKIN: warm and dry, color normal  NEUROLOGIC: AA&O X3, sensation intact all 4 ext  Psych: Normal mood and affect, normal behavior    Plan:   62y  Male is now s/p IWMI "STEMI" now s/p  Left Heart Cardiac Catheterization / PCI/ REBEKAH x2 dRCA ( 100% RCA occlusion) via #6FR RRA by Dr Carol Ann Coko  STEMI:    cont asa, brilinta, high intensity statin  - start BB and ACEi/ARB as per cardiology recommendations: per interventional cardio team, will reduce BB to metoprolol succinnate 12.5mg po qd with hold parameters HR <55 given patient's bradycardia  - follow up cardiac enzymes at 20:30, 03:30    remainder as above

## 2023-09-08 NOTE — CONSULT NOTE ADULT - PROBLEM SELECTOR RECOMMENDATION 9
Pt p/w midsternal chest pain, EKG showed inferior wall MI with ST elevations leads 2,3, AVF and posterior ST depressions  - admitted to MICU  - s/p asa, brilinta, heparin in [place] ED  - now is s/p cardiac cath at [place] with placement of *** stents   - cont asa, brilinta, high intensity statin  - start BB and ACEi/ARB as per cardiology recommendations  - follow up cardiac enzymes at 20:30, 03:30  - obtain TTE  - further management per MICU and cardiology teams Pt p/w midsternal chest pain, EKG showed inferior wall MI with ST elevations leads 2,3, AVF and posterior ST depressions  - admitted to MICU  - s/p asa, brilinta, heparin in [place] ED  - now is s/p cardiac cath at [place] with placement of *** stents   - cont asa, brilinta, high intensity statin  - start BB and ACEi/ARB as per cardiology recommendations: per interventional cardio team, will reduce BB to metoprolol succinnate 12.5mg po qd with hold parameters HR <55 given patient's bradycardia  - follow up cardiac enzymes at 20:30, 03:30  - obtain TTE,  follow up TSH, hbA1c   - further management per MICU and cardiology teams Pt p/w midsternal chest pain, EKG showed inferior wall MI with ST elevations leads 2,3, AVF and posterior ST depressions  - s/p asa, brilinta, heparin in [place] ED  - now is s/p cardiac cath at [place]   - cont asa, brilinta, high intensity statin  - start BB and ACEi/ARB as per cardiology recommendations: per interventional cardio team, will reduce BB to metoprolol succinnate 12.5mg po qd with hold parameters HR <55 given patient's bradycardia  - follow up cardiac enzymes at 20:30, 03:30  - obtain TTE,  follow up TSH, hbA1c   - further management per MICU and cardiology teams Pt p/w midsternal chest pain, EKG showed inferior wall MI with ST elevations leads 2,3, AVF and posterior ST depressions  - s/p asa, brilinta, heparin in [place] ED  - now is s/p cardiac cath at [place] s/p stents  - cont asa, brilinta, high intensity statin  - start BB and ACEi/ARB as per cardiology recommendations: per interventional cardio team, will reduce BB to metoprolol succinnate 12.5mg po qd with hold parameters HR <55 given patient's bradycardia  - follow up cardiac enzymes at 20:30, 03:30  - obtain TTE,  follow up TSH, hbA1c   - further management per MICU and cardiology teams

## 2023-09-08 NOTE — H&P CARDIOLOGY - RS GEN PE MLT RESP DETAILS PC
airway patent/breath sounds equal/good air movement/respirations non-labored/no chest wall tenderness/no intercostal retractions/no rales

## 2023-09-08 NOTE — PROGRESS NOTE ADULT - SUBJECTIVE AND OBJECTIVE BOX
Department of Cardiology                                                                     Division of Interventional Cardiology                                                                  [facility]/ [place] [address] [place] [address]                                                                             Telephone: [phone]                                                    Post- Procedure Note: s/p IWMI "STEMI" now s/p  Left Heart Cardiac Catheterization / PCI/ REBEKAH x2 dRCA ( 100% RCA occlusion) via #6FR RRA by Dr Carol Ann Cook      62y  Male is now s/p IWMI "STEMI" now s/p  Left Heart Cardiac Catheterization / PCI/ REBEKAH x2 dRCA ( 100% RCA occlusion) via #6FR RRA by Dr Carol Ann Cook  Transferred to ICU-Stat EKG ordered, cardiac enzymes Q8h x 3, , Upper RRA hemoband to be removed at 3PM, RRA hemoband to be removed at 5PM  Handoff given to ICU Est[place]  Subjective/ROS:  Denies CP, palpitations, SOB, N/V, fever/chills, dizziness, weakness, numbness/tingling.      HPI:       This is a 62 year old gentleman with unknown PMH ( has not been to a doctor in years) takes no medications, + 1.5 PPD everyday smoker x 40 yrs. presenting to the [place] ED after experiencing 25 min of ongoing midsternal chest pain while weeding. Pt is a . Pt transported to [facility] ED to Cardiac Cath Lab for urgent PCI/ EKG demonstrates an Acute IWMI S-T- elevations in leads 2,3,and AVF with posterior S-T depressions. Pt received  mg po, Heparin 5,000 units IV, and Brilinta 180 mg po in [place] ED. Pt last ate muffins at 7am this morning. Informed consent obtained prior to procedure.    ASA-2  MALL-2  eGFR-77  Creat-1.09  BRA-0.8% (08 Sep 2023 12:18)      PAST MEDICAL & SURGICAL HISTORY:  No pertinent past medical history      MEDICATIONS  (STANDING):  atorvastatin 80 milliGRAM(s) Oral at bedtime  potassium chloride    Tablet ER 40 milliEquivalent(s) Oral every 4 hours  sodium chloride 0.9%. 500 milliLiter(s) (75 mL/Hr) IV Continuous <Continuous>  ticagrelor 90 milliGRAM(s) Oral once    MEDICATIONS  (PRN):    Allergies: No Known Allergies                            17.7   11.18 )-----------( 291      ( 08 Sep 2023 11:32 )             53.0     09-08    139  |  103  |  28<H>  ----------------------------<  118<H>  3.8   |  21<L>  |  1.09    Ca    9.4      08 Sep 2023 11:32    TPro  7.4  /  Alb  4.0  /  TBili  0.5  /  DBili  x   /  AST  24  /  ALT  33  /  AlkPhos  95  09-08    PT/INR - ( 08 Sep 2023 11:32 )   PT: 10.5 sec;   INR: 0.94 ratio         PTT - ( 08 Sep 2023 11:32 )  PTT:40.4 sec      Vital Signs Last 24 Hrs  T(C): 36.6 (08 Sep 2023 11:23), Max: 36.6 (08 Sep 2023 11:23)  T(F): 97.9 (08 Sep 2023 11:23), Max: 97.9 (08 Sep 2023 11:23)  HR: 68 (08 Sep 2023 12:18) (55 - 68)  BP: 137/98 (08 Sep 2023 12:18) (137/98 - 163/92)  BP(mean): 111 (08 Sep 2023 12:18) (111 - 111)  RR: 20 (08 Sep 2023 12:18) (15 - 20)  SpO2: 99% (08 Sep 2023 12:18) (97% - 99%)    Parameters below as of 08 Sep 2023 12:18  Patient On (Oxygen Delivery Method): room air          Post PCI ECG ordered    Constitutional: NAD  Neuro: A+O x 3, non-focal, speech clear and intact  HEENT: NC/AT, PERRL, EOMI, anicteric sclerae, oral mucosa pink and moist  Neck: supple, no JVD  CV: regular rate, regular rhythm, +S1S2, no murmurs or rub  Pulm/chest: lung sounds CTA and equal bilaterally, no accessory muscle use noted  Abd: soft, NT, ND, +BS  Ext: KWAN x 4, no C/C/E  Access site: R wrist C/D/I/soft without hematoma, distal motor/neuro/circ intact. R radial pulse 2+.  Skin: warm, well perfused  Psych: calm, appropriate affect  Tele-NSR 60s

## 2023-09-08 NOTE — CONSULT NOTE ADULT - ASSESSMENT
The patient is a 62 year old male with no known past medical history who presents with chest pain in the setting of inferior STEMI.    Plan:  - ECG consistent with inferior STEMI; mildly bradycardic  - Give aspirin 324 mg now and continue 81 mg daily  - Give ticagrelor 180 mg now and continue 90 mg bid  - Start heparin drip  - Hold beta blocker due to bradycardia  - Start atorvastatin 80 mg daily  - Check echo after transfer  - No evidence of decompensated heart failure on exam  - Transfer to [facility] for emergent cardiac catheterization    31 minutes of critical care time spent with the patient and coordinating care with nursing, hospitalist, and consultants. Patient is critically ill requiring ICU care. The patient is high risk for deterioration and death.

## 2023-09-08 NOTE — PROGRESS NOTE ADULT - ASSESSMENT
62y  Male is now s/p IWMI "STEMI" now s/p  Left Heart Cardiac Catheterization / PCI/ REBEKAH x2 dRCA ( 100% RCA occlusion) via #6FR RRA by Dr Carol Ann Cook  Admit to ICU  for observation post PCI  post cath/PCI routine VS, access site, neuro-vascular monitoring and RUE  post access precautions ordered  post cath hydration as ordered  Bedrest. May get OOB 30 minutes after radial band removed if wrist and hemodynamics remain stable   EKG post cath done  trend cardiac enzymes  TTE ordered  consider tobacco cessation nicotine patch ( 1.5 PPD x 40 Years)  f/u labs and EKG in am  continue dual anti platelet therapy with aspirin AND ticagrelor  Pt education provided/reinforced re: importance of strict adherence to uninterrupted DAPT for minimum of 3-12 months (cardiologist will determine duration)  high intensity statin  may resume prior diet  Lifestyle modifications discussed to reduce cardiovascular risk factors including weight reduction, smoking cessation (referral provided ) medication compliance, and routine follow up with Cardiologist to track your BMI, cholesterol, and glucose levels.   follow-up next week with Dr Cook for post PCI check  cardiac rehab info provided/referral and communication to cardiac rehab in progress  BB/ ACE-I deferred to general cardiology   follow-up in 2 weeks with outpatient/referring cardiologist-Dr Jose Foreman  continue home medication regimen as appropriate  remainder of plan per primary team  above d/w PICC PA/ ICU team

## 2023-09-08 NOTE — ED PROVIDER NOTE - OBJECTIVE STATEMENT
Patient complaining 15 minutes of substernal chest pressure. No fevers chills short of breath cough nausea vomiting abdominal pain edema calf pain or travel.  No history of PE or DVT.

## 2023-09-09 LAB
A1C WITH ESTIMATED AVERAGE GLUCOSE RESULT: 5.6 % — SIGNIFICANT CHANGE UP (ref 4–5.6)
ALBUMIN SERPL ELPH-MCNC: 3.1 G/DL — LOW (ref 3.3–5)
ALP SERPL-CCNC: 87 U/L — SIGNIFICANT CHANGE UP (ref 40–120)
ALT FLD-CCNC: 40 U/L — SIGNIFICANT CHANGE UP (ref 12–78)
ANION GAP SERPL CALC-SCNC: 10 MMOL/L — SIGNIFICANT CHANGE UP (ref 5–17)
AST SERPL-CCNC: 104 U/L — HIGH (ref 15–37)
BASOPHILS # BLD AUTO: 0.06 K/UL — SIGNIFICANT CHANGE UP (ref 0–0.2)
BASOPHILS NFR BLD AUTO: 0.5 % — SIGNIFICANT CHANGE UP (ref 0–2)
BILIRUB SERPL-MCNC: 1 MG/DL — SIGNIFICANT CHANGE UP (ref 0.2–1.2)
BUN SERPL-MCNC: 20 MG/DL — SIGNIFICANT CHANGE UP (ref 7–23)
CALCIUM SERPL-MCNC: 8.3 MG/DL — LOW (ref 8.5–10.1)
CHLORIDE SERPL-SCNC: 108 MMOL/L — SIGNIFICANT CHANGE UP (ref 96–108)
CHOLEST SERPL-MCNC: 170 MG/DL — SIGNIFICANT CHANGE UP
CK MB BLD-MCNC: 13.5 % — HIGH (ref 0–3.5)
CK MB CFR SERPL CALC: 61.3 NG/ML — HIGH (ref 0–3.6)
CK SERPL-CCNC: 454 U/L — HIGH (ref 26–308)
CO2 SERPL-SCNC: 20 MMOL/L — LOW (ref 22–31)
CREAT SERPL-MCNC: 0.89 MG/DL — SIGNIFICANT CHANGE UP (ref 0.5–1.3)
EGFR: 97 ML/MIN/1.73M2 — SIGNIFICANT CHANGE UP
EOSINOPHIL # BLD AUTO: 0.25 K/UL — SIGNIFICANT CHANGE UP (ref 0–0.5)
EOSINOPHIL NFR BLD AUTO: 2.2 % — SIGNIFICANT CHANGE UP (ref 0–6)
ESTIMATED AVERAGE GLUCOSE: 114 MG/DL — SIGNIFICANT CHANGE UP (ref 68–114)
GLUCOSE SERPL-MCNC: 104 MG/DL — HIGH (ref 70–99)
HCT VFR BLD CALC: 50.2 % — HIGH (ref 39–50)
HDLC SERPL-MCNC: 28 MG/DL — LOW
HGB BLD-MCNC: 16.9 G/DL — SIGNIFICANT CHANGE UP (ref 13–17)
IMM GRANULOCYTES NFR BLD AUTO: 0.5 % — SIGNIFICANT CHANGE UP (ref 0–0.9)
LIPID PNL WITH DIRECT LDL SERPL: 120 MG/DL — HIGH
LYMPHOCYTES # BLD AUTO: 1.61 K/UL — SIGNIFICANT CHANGE UP (ref 1–3.3)
LYMPHOCYTES # BLD AUTO: 14.4 % — SIGNIFICANT CHANGE UP (ref 13–44)
MAGNESIUM SERPL-MCNC: 2 MG/DL — SIGNIFICANT CHANGE UP (ref 1.6–2.6)
MCHC RBC-ENTMCNC: 28.6 PG — SIGNIFICANT CHANGE UP (ref 27–34)
MCHC RBC-ENTMCNC: 33.7 GM/DL — SIGNIFICANT CHANGE UP (ref 32–36)
MCV RBC AUTO: 85.1 FL — SIGNIFICANT CHANGE UP (ref 80–100)
MONOCYTES # BLD AUTO: 0.82 K/UL — SIGNIFICANT CHANGE UP (ref 0–0.9)
MONOCYTES NFR BLD AUTO: 7.3 % — SIGNIFICANT CHANGE UP (ref 2–14)
NEUTROPHILS # BLD AUTO: 8.37 K/UL — HIGH (ref 1.8–7.4)
NEUTROPHILS NFR BLD AUTO: 75.1 % — SIGNIFICANT CHANGE UP (ref 43–77)
NON HDL CHOLESTEROL: 142 MG/DL — HIGH
NRBC # BLD: 0 /100 WBCS — SIGNIFICANT CHANGE UP (ref 0–0)
PHOSPHATE SERPL-MCNC: 1.8 MG/DL — LOW (ref 2.5–4.5)
PLATELET # BLD AUTO: 245 K/UL — SIGNIFICANT CHANGE UP (ref 150–400)
POTASSIUM SERPL-MCNC: 4.3 MMOL/L — SIGNIFICANT CHANGE UP (ref 3.5–5.3)
POTASSIUM SERPL-SCNC: 4.3 MMOL/L — SIGNIFICANT CHANGE UP (ref 3.5–5.3)
PROT SERPL-MCNC: 6.6 G/DL — SIGNIFICANT CHANGE UP (ref 6–8.3)
RBC # BLD: 5.9 M/UL — HIGH (ref 4.2–5.8)
RBC # FLD: 13.5 % — SIGNIFICANT CHANGE UP (ref 10.3–14.5)
SODIUM SERPL-SCNC: 138 MMOL/L — SIGNIFICANT CHANGE UP (ref 135–145)
TRIGL SERPL-MCNC: 119 MG/DL — SIGNIFICANT CHANGE UP
TROPONIN I, HIGH SENSITIVITY RESULT: HIGH NG/L
TSH SERPL-MCNC: 1.68 UIU/ML — SIGNIFICANT CHANGE UP (ref 0.36–3.74)
WBC # BLD: 11.17 K/UL — HIGH (ref 3.8–10.5)
WBC # FLD AUTO: 11.17 K/UL — HIGH (ref 3.8–10.5)

## 2023-09-09 PROCEDURE — 99233 SBSQ HOSP IP/OBS HIGH 50: CPT | Mod: GC

## 2023-09-09 PROCEDURE — 93010 ELECTROCARDIOGRAM REPORT: CPT

## 2023-09-09 RX ORDER — METOPROLOL TARTRATE 50 MG
12.5 TABLET ORAL DAILY
Refills: 0 | Status: DISCONTINUED | OUTPATIENT
Start: 2023-09-09 | End: 2023-09-10

## 2023-09-09 RX ORDER — TICAGRELOR 90 MG/1
1 TABLET ORAL
Qty: 60 | Refills: 0
Start: 2023-09-09 | End: 2023-10-08

## 2023-09-09 RX ORDER — ASPIRIN/CALCIUM CARB/MAGNESIUM 324 MG
1 TABLET ORAL
Qty: 30 | Refills: 0
Start: 2023-09-09 | End: 2023-10-08

## 2023-09-09 RX ORDER — FUROSEMIDE 40 MG
20 TABLET ORAL ONCE
Refills: 0 | Status: COMPLETED | OUTPATIENT
Start: 2023-09-09 | End: 2023-09-09

## 2023-09-09 RX ORDER — ATORVASTATIN CALCIUM 80 MG/1
1 TABLET, FILM COATED ORAL
Qty: 30 | Refills: 0
Start: 2023-09-09 | End: 2023-10-08

## 2023-09-09 RX ORDER — SODIUM,POTASSIUM PHOSPHATES 278-250MG
1 POWDER IN PACKET (EA) ORAL ONCE
Refills: 0 | Status: COMPLETED | OUTPATIENT
Start: 2023-09-09 | End: 2023-09-09

## 2023-09-09 RX ADMIN — Medication 81 MILLIGRAM(S): at 11:00

## 2023-09-09 RX ADMIN — ATORVASTATIN CALCIUM 80 MILLIGRAM(S): 80 TABLET, FILM COATED ORAL at 21:47

## 2023-09-09 RX ADMIN — TICAGRELOR 90 MILLIGRAM(S): 90 TABLET ORAL at 17:21

## 2023-09-09 RX ADMIN — Medication 1 PATCH: at 11:00

## 2023-09-09 RX ADMIN — Medication 1 PATCH: at 17:01

## 2023-09-09 RX ADMIN — Medication 1 PATCH: at 07:19

## 2023-09-09 RX ADMIN — LISINOPRIL 5 MILLIGRAM(S): 2.5 TABLET ORAL at 06:13

## 2023-09-09 RX ADMIN — Medication 1 PATCH: at 19:24

## 2023-09-09 RX ADMIN — Medication 20 MILLIGRAM(S): at 09:41

## 2023-09-09 RX ADMIN — TICAGRELOR 90 MILLIGRAM(S): 90 TABLET ORAL at 06:13

## 2023-09-09 RX ADMIN — Medication 1 PACKET(S): at 08:08

## 2023-09-09 NOTE — PROGRESS NOTE ADULT - SUBJECTIVE AND OBJECTIVE BOX
Chief Complaint: Inferior STEMI    Interval Events: No events overnight.    Review of Systems:  General: No fevers, chills, weight gain  Skin: No rashes, color changes  Cardiovascular: No chest pain, orthopnea  Respiratory: No shortness of breath, cough  Gastrointestinal: No nausea, abdominal pain  Genitourinary: No incontinence, pain with urination  Musculoskeletal: No pain, swelling, decreased range of motion  Neurological: No headache, weakness  Psychiatric: No depression, anxiety  Endocrine: No weight gain, increased thirst  All other systems are comprehensively negative.    Physical Exam:  Vitals:        Vital Signs Last 24 Hrs  T(C): 36.7 (09 Sep 2023 04:36), Max: 36.7 (09 Sep 2023 00:07)  T(F): 98 (09 Sep 2023 04:36), Max: 98.1 (09 Sep 2023 00:07)  HR: 51 (09 Sep 2023 06:00) (43 - 68)  BP: 130/79 (09 Sep 2023 06:00) (110/64 - 163/92)  BP(mean): 100 (09 Sep 2023 06:00) (82 - 113)  RR: 23 (09 Sep 2023 06:00) (15 - 30)  SpO2: 96% (09 Sep 2023 06:00) (92% - 99%)  Parameters below as of 09 Sep 2023 01:00  Patient On (Oxygen Delivery Method): room air  General: NAD  HEENT: MMM  Neck: No JVD, no carotid bruit  Lungs: CTAB  CV: RRR, nl S1/S2, no M/R/G  Abdomen: S/NT/ND, +BS  Extremities: No LE edema, no cyanosis  Neuro: AAOx3, non-focal  Skin: No rash    Labs:                        17.7   11.18 )-----------( 291      ( 08 Sep 2023 11:32 )             53.0     09-08    139  |  103  |  28<H>  ----------------------------<  118<H>  3.8   |  21<L>  |  1.09    Ca    9.4      08 Sep 2023 11:32    TPro  7.4  /  Alb  4.0  /  TBili  0.5  /  DBili  x   /  AST  24  /  ALT  33  /  AlkPhos  95  09-08    CARDIAC MARKERS ( 09 Sep 2023 03:38 )  x     / x     / 454 U/L / x     / 61.3 ng/mL  CARDIAC MARKERS ( 08 Sep 2023 21:15 )  x     / x     / 684 U/L / x     / 92.5 ng/mL  CARDIAC MARKERS ( 08 Sep 2023 13:00 )  x     / x     / 497 U/L / x     / 42.1 ng/mL      PT/INR - ( 08 Sep 2023 11:32 )   PT: 10.5 sec;   INR: 0.94 ratio         PTT - ( 08 Sep 2023 11:32 )  PTT:40.4 sec    ECG/Telemetry: Sinus bradycardia, PVCs, ventricular couplet

## 2023-09-09 NOTE — PROGRESS NOTE ADULT - SUBJECTIVE AND OBJECTIVE BOX
INTERVAL HPI/OVERNIGHT EVENTS: Remained bradycardic overnight HR 40-50s    SUBJECTIVE: Seen and examined pt at bedside. Admits to shortness of breath "feeling winded" when he tries to fall asleep, regardless of if laying flat or reclined. No cp or palpitations    Review of Systems:  Constitutional: No fever, chills, fatigue  Neuro: No headache, numbness, weakness  Resp: +SOB. No cough, wheezing   CVS: No chest pain, palpitations, leg swelling  GI: No abdominal pain, nausea, vomiting, diarrhea   : No dysuria, frequency, incontinence  Skin: No itching, burning, rashes, or lesions   Msk: No joint pain or swelling  Psych: No depression, anxiety, mood swings    ICU Vital Signs Last 24 Hrs  T(C): 36.9 (09 Sep 2023 07:55), Max: 36.9 (09 Sep 2023 07:55)  T(F): 98.4 (09 Sep 2023 07:55), Max: 98.4 (09 Sep 2023 07:55)  HR: 52 (09 Sep 2023 11:00) (43 - 68)  BP: 120/71 (09 Sep 2023 11:00) (110/64 - 163/92)  BP(mean): 90 (09 Sep 2023 11:00) (82 - 113)  ABP: --  ABP(mean): --  RR: 26 (09 Sep 2023 11:00) (15 - 30)  SpO2: 93% (09 Sep 2023 11:00) (92% - 99%)    O2 Parameters below as of 09 Sep 2023 08:00  Patient On (Oxygen Delivery Method): room air              09-08-23 @ 07:01  -  09-09-23 @ 07:00  --------------------------------------------------------  IN: 1050 mL / OUT: 1400 mL / NET: -350 mL        CAPILLARY BLOOD GLUCOSE          I&O's Summary    08 Sep 2023 07:01  -  09 Sep 2023 07:00  --------------------------------------------------------  IN: 1050 mL / OUT: 1400 mL / NET: -350 mL        PHYSICAL EXAM:  Gen: well appearing male, NAD  Neuro: alert, oriented x3, nonfocal  HEENT: mmm  Resp: cta b/l  Cardio: bradycardic rate, regular rhythm  Abd: soft, nt  Ext: no pitting edema  Skin: warm, dry    Meds:    lisinopril Oral  metoprolol succinate ER Oral    atorvastatin Oral          aspirin enteric coated Oral  ticagrelor Oral        sodium chloride 0.9%. IV Continuous    influenza   Vaccine IntraMuscular      nicotine - 21 mG/24Hr(s) Patch Transdermal                            16.9   11.17 )-----------( 245      ( 09 Sep 2023 05:50 )             50.2       09-09    138  |  108  |  20  ----------------------------<  104<H>  4.3   |  20<L>  |  0.89    Ca    8.3<L>      09 Sep 2023 05:50  Phos  1.8     09-09  Mg     2.0     09-09    TPro  6.6  /  Alb  3.1<L>  /  TBili  1.0  /  DBili  x   /  AST  104<H>  /  ALT  40  /  AlkPhos  87  09-09      CARDIAC MARKERS ( 09 Sep 2023 03:38 )  x     / x     / 454 U/L / x     / 61.3 ng/mL  CARDIAC MARKERS ( 08 Sep 2023 21:15 )  x     / x     / 684 U/L / x     / 92.5 ng/mL  CARDIAC MARKERS ( 08 Sep 2023 13:00 )  x     / x     / 497 U/L / x     / 42.1 ng/mL      PT/INR - ( 08 Sep 2023 11:32 )   PT: 10.5 sec;   INR: 0.94 ratio         PTT - ( 08 Sep 2023 11:32 )  PTT:40.4 sec  Urinalysis Basic - ( 09 Sep 2023 05:50 )    Color: x / Appearance: x / SG: x / pH: x  Gluc: 104 mg/dL / Ketone: x  / Bili: x / Urobili: x   Blood: x / Protein: x / Nitrite: x   Leuk Esterase: x / RBC: x / WBC x   Sq Epi: x / Non Sq Epi: x / Bacteria: x                  Radiology: no new imaging    Bedside Ultrasound: n/a    Tubes/Lines: peripheral iv      GLOBAL ISSUE/BEST PRACTICE:  Analgesia: N  Sedation: N  HOB elevation: Y  Stress ulcer prophylaxis: N  VTE prophylaxis: SCDs  Glycemic control: N  Nutrition: Diet    CODE STATUS:  FULL CODE

## 2023-09-09 NOTE — DIETITIAN INITIAL EVALUATION ADULT - PERTINENT LABORATORY DATA
09-09    138  |  108  |  20  ----------------------------<  104<H>  4.3   |  20<L>  |  0.89    Ca    8.3<L>      09 Sep 2023 05:50  Phos  1.8     09-09  Mg     2.0     09-09    TPro  6.6  /  Alb  3.1<L>  /  TBili  1.0  /  DBili  x   /  AST  104<H>  /  ALT  40  /  AlkPhos  87  09-09

## 2023-09-09 NOTE — DIETITIAN INITIAL EVALUATION ADULT - ORAL INTAKE PTA/DIET HISTORY
Pt reports good appetite/intake PTA. Typically consumes 3 meals/day. Follows a regular diet. Does own grocery shopping and meal preparation. Consumes simple, easy to prepare meals. Gets takeout often. Does not add salt to his food.

## 2023-09-09 NOTE — PROGRESS NOTE ADULT - SUBJECTIVE AND OBJECTIVE BOX
Patient is a 62y old  Male who presents with a chief complaint of IWMI/ STEMI (08 Sep 2023 13:38)    BRIEF HOSPITAL COURSE:   62y  Male unknown PMH ( has not been to a doctor in years) takes no medications, + 1.5 PPD everyday smoker x 40 yrs. presenting to the [facility] ED after experiencing 25 min of ongoing midsternal chest pain, transported to \[facility]\"" ED to Cardiac Cath Lab for urgent PCI/ EKG demonstrates IWMI STEMI now s/p  Left Heart Cardiac Catheterization / PCI/ REBEKAH x2 dRCA (100% RCA occlusion).    Events last 24 hours:   -Troponin peaked and downtrending. Currently CP free.  -Remains bradycardic to 40-50s. Asx.   -Satting well on RA. Afebrile.     PAST MEDICAL & SURGICAL HISTORY:  No pertinent past medical history    Review of Systems:  CONSTITUTIONAL: No fever, chills, or fatigue  EYES: No eye pain, visual disturbances, or discharge  ENMT:  No difficulty hearing, tinnitus, vertigo; No sinus or throat pain  NECK: No pain or stiffness  RESPIRATORY: No cough, wheezing, chills or hemoptysis; No shortness of breath  CARDIOVASCULAR: No chest pain, palpitations, dizziness, or leg swelling  GASTROINTESTINAL: No abdominal or epigastric pain. No nausea, vomiting, or hematemesis; No diarrhea or constipation. No melena or hematochezia.  GENITOURINARY: No dysuria, frequency, hematuria, or incontinence  NEUROLOGICAL: No headaches, memory loss, loss of strength, numbness, or tremors  SKIN: No itching, burning, rashes, or lesions   MUSCULOSKELETAL: No joint pain or swelling; No muscle, back, or extremity pain  PSYCHIATRIC: No depression, anxiety, mood swings, or difficulty sleeping    Medications:  metoprolol succinate ER 12.5 milliGRAM(s) Oral daily  atorvastatin 80 milliGRAM(s) Oral at bedtime  sodium chloride 0.9%. 500 milliLiter(s) IV Continuous <Continuous>  influenza   Vaccine 0.5 milliLiter(s) IntraMuscular once  nicotine - 21 mG/24Hr(s) Patch 1 Patch Transdermal daily    ICU Vital Signs Last 24 Hrs  T(C): 36.6 (08 Sep 2023 20:48), Max: 36.6 (08 Sep 2023 11:23)  T(F): 97.9 (08 Sep 2023 20:48), Max: 97.9 (08 Sep 2023 11:23)  HR: 43 (08 Sep 2023 22:00) (43 - 68)  BP: 138/89 (08 Sep 2023 22:00) (119/76 - 163/92)  BP(mean): 108 (08 Sep 2023 22:00) (94 - 113)  ABP: --  ABP(mean): --  RR: 24 (08 Sep 2023 22:00) (15 - 30)  SpO2: 93% (08 Sep 2023 22:00) (93% - 99%)  O2 Parameters below as of 08 Sep 2023 22:00  Patient On (Oxygen Delivery Method): room air    I&O's Detail  08 Sep 2023 07:01  -  08 Sep 2023 22:28  --------------------------------------------------------  IN:    Oral Fluid: 750 mL    sodium chloride 0.9%: 300 mL  Total IN: 1050 mL  OUT:    Voided (mL): 200 mL  Total OUT: 200 mL  Total NET: 850 mL    LABS:                      17.7   11.18 )-----------( 291      ( 08 Sep 2023 11:32 )             53.0     09-08  139  |  103  |  28<H>  ----------------------------<  118<H>  3.8   |  21<L>  |  1.09  Ca    9.4      08 Sep 2023 11:32  TPro  7.4  /  Alb  4.0  /  TBili  0.5  /  DBili  x   /  AST  24  /  ALT  33  /  AlkPhos  95  09-08    CARDIAC MARKERS ( 08 Sep 2023 21:15 )  x     / x     / 684 U/L / x     / 92.5 ng/mL  CARDIAC MARKERS ( 08 Sep 2023 13:00 )  x     / x     / 497 U/L / x     / 42.1 ng/mL    CAPILLARY BLOOD GLUCOSE    PT/INR - ( 08 Sep 2023 11:32 )   PT: 10.5 sec;   INR: 0.94 ratio    PTT - ( 08 Sep 2023 11:32 )  PTT:40.4 sec    Urinalysis Basic - ( 08 Sep 2023 11:32 )  Color: x / Appearance: x / SG: x / pH: x  Gluc: 118 mg/dL / Ketone: x  / Bili: x / Urobili: x   Blood: x / Protein: x / Nitrite: x   Leuk Esterase: x / RBC: x / WBC x   Sq Epi: x / Non Sq Epi: x / Bacteria: x    CULTURES:    < from: Xray Chest 1 View- PORTABLE-Urgent (09.08.23 @ 11:35) >  ACC: [id number] EXAM:  XR CHEST PORTABLE URGENT 1V   ORDERED BY: LORRIE KIRK   PROCEDURE DATE:  09/08/2023    IMPRESSION: Negative chest.    62y  Male unknown PMH ( has not been to a doctor in years) takes no medications, + 1.5 PPD everyday smoker x 40 yrs. presenting to the [facility] ED after experiencing 25 min of ongoing midsternal chest pain, transported to \[facility]\"" ED to Cardiac Cath Lab for urgent PCI/ EKG demonstrates IWMI STEMI now s/p  Left Heart Cardiac Catheterization / PCI/ REBEKAH x2 dRCA (100% RCA occlusion).  Assessment:  1. STEMI  2. Bradycardia     Plan:  NEURO:   -Monitor mental status closely, avoid neurosuppresants. Serial neurologic assessments.     CV:  -Maintain goal MAP >65.   -Keep K~4 and Mg>2 for optimal arrhythmia suppression.  -DAPT. High-intensity Statin. HOLD BB given bradycardia, hopefully can reinstitute in AM. Lisinopril QD.   -Troponin peaked, downtrending. Serial EKG/Sherrie.   -Check Lipid panel.   -TTE w/ LVEF 45-50%.     PULM:   -Satting well on RA, will utilize supplemental O2 PRN to maintain goal SpO2 >88%.   -Incentive spirometry, Chest PT/Pulmonary toilet, HOB >30'.     GI:   -DASH/TLC Diet.     RENAL:   -Renal function currently WNL.  -trend lytes/Scr daily with BMP  -I's and O's, goal UOP 0.5 cc/kg/hr  -renal dose meds and avoid nephrotoxins     ENDO:   -Aggressive glycemic control to limit FS glucose to <180mg/dl. Check A1c.   -Keep Euthyroid. Check TSH.     ID:   -Afebrile. No concern for infectious process. Monitor off abx.     HEME:   -DVT ppx.     GOC: Full Code.     Time spent on this patient encounter, which includes documenting this note in the electronic medical record, was >50 minutes including assessing the presenting problems with associated risks, reviewing the medical record to prepare for the encounter, and meeting face to face with the patient to obtain additional history. I have also performed an appropriate physical exam, made interventions listed and ordered and interpreted appropriate diagnostic studies as documented. To improve communication and patient safety, I have coordinated care with the multidisciplinary team including the bedside nurse, appropriate attending of record and consultants as needed. This time is independent of any procedures performed.  Patient is a 62y old  Male who presents with a chief complaint of IWMI/ STEMI (08 Sep 2023 13:38)    BRIEF HOSPITAL COURSE:   62y  Male unknown PMH ( has not been to a doctor in years) takes no medications, + 1.5 PPD everyday smoker x 40 yrs. presenting to the [facility] ED after experiencing 25 min of ongoing midsternal chest pain, transported to [facility] ED to Cardiac Cath Lab for urgent PCI/ EKG demonstrates IWMI STEMI now s/p  Left Heart Cardiac Catheterization / PCI/ REBEKAH x2 dRCA (100% RCA occlusion).    Events last 24 hours:   -Troponin peaked and downtrending. Currently CP free.  -Remains bradycardic to 40-50s. Asx.   -Satting well on RA. Afebrile.     PAST MEDICAL & SURGICAL HISTORY:  No pertinent past medical history    Review of Systems:  CONSTITUTIONAL: No fever, chills, or fatigue  EYES: No eye pain, visual disturbances, or discharge  ENMT:  No difficulty hearing, tinnitus, vertigo; No sinus or throat pain  NECK: No pain or stiffness  RESPIRATORY: No cough, wheezing, chills or hemoptysis; No shortness of breath  CARDIOVASCULAR: No chest pain, palpitations, dizziness, or leg swelling  GASTROINTESTINAL: No abdominal or epigastric pain. No nausea, vomiting, or hematemesis; No diarrhea or constipation. No melena or hematochezia.  GENITOURINARY: No dysuria, frequency, hematuria, or incontinence  NEUROLOGICAL: No headaches, memory loss, loss of strength, numbness, or tremors  SKIN: No itching, burning, rashes, or lesions   MUSCULOSKELETAL: No joint pain or swelling; No muscle, back, or extremity pain  PSYCHIATRIC: No depression, anxiety, mood swings, or difficulty sleeping    Medications:  metoprolol succinate ER 12.5 milliGRAM(s) Oral daily  atorvastatin 80 milliGRAM(s) Oral at bedtime  sodium chloride 0.9%. 500 milliLiter(s) IV Continuous <Continuous>  influenza   Vaccine 0.5 milliLiter(s) IntraMuscular once  nicotine - 21 mG/24Hr(s) Patch 1 Patch Transdermal daily    ICU Vital Signs Last 24 Hrs  T(C): 36.6 (08 Sep 2023 20:48), Max: 36.6 (08 Sep 2023 11:23)  T(F): 97.9 (08 Sep 2023 20:48), Max: 97.9 (08 Sep 2023 11:23)  HR: 43 (08 Sep 2023 22:00) (43 - 68)  BP: 138/89 (08 Sep 2023 22:00) (119/76 - 163/92)  BP(mean): 108 (08 Sep 2023 22:00) (94 - 113)  ABP: --  ABP(mean): --  RR: 24 (08 Sep 2023 22:00) (15 - 30)  SpO2: 93% (08 Sep 2023 22:00) (93% - 99%)  O2 Parameters below as of 08 Sep 2023 22:00  Patient On (Oxygen Delivery Method): room air    I&O's Detail  08 Sep 2023 07:01  -  08 Sep 2023 22:28  --------------------------------------------------------  IN:    Oral Fluid: 750 mL    sodium chloride 0.9%: 300 mL  Total IN: 1050 mL  OUT:    Voided (mL): 200 mL  Total OUT: 200 mL  Total NET: 850 mL    LABS:                      17.7   11.18 )-----------( 291      ( 08 Sep 2023 11:32 )             53.0     09-08  139  |  103  |  28<H>  ----------------------------<  118<H>  3.8   |  21<L>  |  1.09  Ca    9.4      08 Sep 2023 11:32  TPro  7.4  /  Alb  4.0  /  TBili  0.5  /  DBili  x   /  AST  24  /  ALT  33  /  AlkPhos  95  09-08    CARDIAC MARKERS ( 08 Sep 2023 21:15 )  x     / x     / 684 U/L / x     / 92.5 ng/mL  CARDIAC MARKERS ( 08 Sep 2023 13:00 )  x     / x     / 497 U/L / x     / 42.1 ng/mL    CAPILLARY BLOOD GLUCOSE    PT/INR - ( 08 Sep 2023 11:32 )   PT: 10.5 sec;   INR: 0.94 ratio    PTT - ( 08 Sep 2023 11:32 )  PTT:40.4 sec    Urinalysis Basic - ( 08 Sep 2023 11:32 )  Color: x / Appearance: x / SG: x / pH: x  Gluc: 118 mg/dL / Ketone: x  / Bili: x / Urobili: x   Blood: x / Protein: x / Nitrite: x   Leuk Esterase: x / RBC: x / WBC x   Sq Epi: x / Non Sq Epi: x / Bacteria: x    CULTURES:    Physical Examination:  General: Alert, oriented, interactive, nonfocal.  HEENT: PERRL.  NECK: Supple.   PULM: Clear to auscultation bilaterally.  CVS: s1/s2.  ABD: Soft, nondistended, nontender, normoactive bowel sounds.  EXT: No edema, nontender.  SKIN: Warm.    < from: Xray Chest 1 View- PORTABLE-Urgent (09.08.23 @ 11:35) >  ACC: [id number] EXAM:  XR CHEST PORTABLE URGENT 1V   ORDERED BY: LORRIE KIRK   PROCEDURE DATE:  09/08/2023    IMPRESSION: Negative chest.    62y  Male unknown PMH ( has not been to a doctor in years) takes no medications, + 1.5 PPD everyday smoker x 40 yrs. presenting to the [facility] ED after experiencing 25 min of ongoing midsternal chest pain, transported to [facility] ED to Cardiac Cath Lab for urgent PCI/ EKG demonstrates IWMI STEMI now s/p  Left Heart Cardiac Catheterization / PCI/ REBEKAH x2 dRCA (100% RCA occlusion).  Assessment:  1. STEMI  2. Bradycardia     Plan:  NEURO:   -Monitor mental status closely, avoid neurosuppresants. Serial neurologic assessments.     CV:  -Maintain goal MAP >65.   -Keep K~4 and Mg>2 for optimal arrhythmia suppression.  -DAPT. High-intensity Statin. HOLD BB given bradycardia, hopefully can reinstitute in AM. Lisinopril QD.   -Troponin peaked, downtrending. Serial EKG/Sherrie.   -Check Lipid panel.   -TTE w/ LVEF 45-50%.     PULM:   -Satting well on RA, will utilize supplemental O2 PRN to maintain goal SpO2 >88%.   -Incentive spirometry, Chest PT/Pulmonary toilet, HOB >30'.     GI:   -DASH/TLC Diet.     RENAL:   -Renal function currently WNL.  -trend lytes/Scr daily with BMP  -I's and O's, goal UOP 0.5 cc/kg/hr  -renal dose meds and avoid nephrotoxins     ENDO:   -Aggressive glycemic control to limit FS glucose to <180mg/dl. Check A1c.   -Keep Euthyroid. Check TSH.     ID:   -Afebrile. No concern for infectious process. Monitor off abx.     HEME:   -DVT ppx.     GOC: Full Code.     Time spent on this patient encounter, which includes documenting this note in the electronic medical record, was >50 minutes including assessing the presenting problems with associated risks, reviewing the medical record to prepare for the encounter, and meeting face to face with the patient to obtain additional history. I have also performed an appropriate physical exam, made interventions listed and ordered and interpreted appropriate diagnostic studies as documented. To improve communication and patient safety, I have coordinated care with the multidisciplinary team including the bedside nurse, appropriate attending of record and consultants as needed. This time is independent of any procedures performed.

## 2023-09-09 NOTE — DIETITIAN INITIAL EVALUATION ADULT - ADD RECOMMEND
1) Continue current diet as ordered; honor food preferences as able to optimize po intake/tolerance  2) Monitor po intake, diet tolerance, weight trends, labs, GI function, skin integrity

## 2023-09-09 NOTE — PROGRESS NOTE ADULT - ASSESSMENT
The patient is a 62 year old male with no known past medical history who presents with chest pain in the setting of inferior STEMI.    Plan:  - Sinus bradycardic to upper 40/lower 50s  - Echo with mildly reduced LV systolic function  - Underwent PCI to RCA  - Continue aspirin 81 mg daily  - Continue ticagrelor 90 mg bid  - Hold beta blocker due to bradycardia. If HR trends above 55, resume metoprolol succinate 12.5 mg daily.  - Continue atorvastatin 80 mg daily  - Start lisinopril 5 mg daily

## 2023-09-09 NOTE — DIETITIAN INITIAL EVALUATION ADULT - OTHER INFO
Pt is a "63 yo male unknown PMH ( has not been to a doctor in years) takes no medications, + 1.5 PPD everyday smoker x 40 yrs. presenting to the [facility] ED after experiencing 25 min of ongoing midsternal chest pain, transported to [facility] ED to Cardiac Cath Lab for urgent PCI/ EKG demonstrates IWMI STEMI now s/p  Left Heart Cardiac Catheterization / PCI/ REBEKAH x2 dRCA (100% RCA occlusion)."    Visited pt at bedside this am. Pt reports fair appetite/intake. Consumed ~50% of breakfast meal this am. Tolerating diet well. No food allergies. No chewing/swallowing difficulties. No N/V/D/C per chart review. No BMs thus far. CBW on admission 227#. Reports UBW of 235-240#. No edema noted. Skin: R radial cath site. Pt currently on DASH/TLC diet. Food preferences obtained to optimize po intake/tolerance. Provided verbal and written Heart Healthy diet education during visit today.

## 2023-09-09 NOTE — PROGRESS NOTE ADULT - ASSESSMENT
61 y/o M active smoker presented to [facility] ED with midsternal chest pain and was transferred to the [facility] Cardiac Cath Lab s/p asa 324mg, heparin 5000u, brilinta, for urgent PCI. Patient was found to have acute inferior wall STEMI and underwent cardiac cath with placement of 2 REBEKAH to dRCA stents.     Neuro: A&Ox3, mentating well   Cardio: STEMI 2/2 2 REBEKAH to dRCA, cont asa, brilinta, high intensity statin, ACEi  Bradycardia- overnight HR 40-50s, cont metoprolol succinate 12.5mg po qd with hold for HR<55   TTE reviewed EF 45% with inc lv wall thickness  Follow up lipid panel  Pulm: no active issues  GI: tolerating diet  Renal: renal indices wnl, good UOP/monitor, replete electrolytes PRN  ID: no active issues  Endo: TSH WNL, follow up hbA1c  Heme: VTE ppx-encourage ambulation  Dispo: Full code

## 2023-09-09 NOTE — DIETITIAN INITIAL EVALUATION ADULT - NS FNS DIET ORDER
Diet, DASH/TLC:   Sodium & Cholesterol Restricted     Special Instructions for Nursing:  Sodium & Cholesterol Restricted (09-08-23 @ 13:18)

## 2023-09-09 NOTE — DIETITIAN INITIAL EVALUATION ADULT - PERTINENT MEDS FT
MEDICATIONS  (STANDING):  aspirin enteric coated 81 milliGRAM(s) Oral daily  atorvastatin 80 milliGRAM(s) Oral at bedtime  influenza   Vaccine 0.5 milliLiter(s) IntraMuscular once  lisinopril 5 milliGRAM(s) Oral daily  metoprolol succinate ER 12.5 milliGRAM(s) Oral daily  nicotine - 21 mG/24Hr(s) Patch 1 Patch Transdermal daily  sodium chloride 0.9%. 500 milliLiter(s) (75 mL/Hr) IV Continuous <Continuous>  ticagrelor 90 milliGRAM(s) Oral every 12 hours    MEDICATIONS  (PRN):

## 2023-09-10 ENCOUNTER — TRANSCRIPTION ENCOUNTER (OUTPATIENT)
Age: 63
End: 2023-09-10

## 2023-09-10 LAB
A1C WITH ESTIMATED AVERAGE GLUCOSE RESULT: 5.6 % — SIGNIFICANT CHANGE UP (ref 4–5.6)
ALBUMIN SERPL ELPH-MCNC: 3.1 G/DL — LOW (ref 3.3–5)
ALP SERPL-CCNC: 94 U/L — SIGNIFICANT CHANGE UP (ref 40–120)
ALT FLD-CCNC: 36 U/L — SIGNIFICANT CHANGE UP (ref 12–78)
ANION GAP SERPL CALC-SCNC: 5 MMOL/L — SIGNIFICANT CHANGE UP (ref 5–17)
AST SERPL-CCNC: 48 U/L — HIGH (ref 15–37)
BASOPHILS # BLD AUTO: 0.08 K/UL — SIGNIFICANT CHANGE UP (ref 0–0.2)
BASOPHILS NFR BLD AUTO: 0.8 % — SIGNIFICANT CHANGE UP (ref 0–2)
BILIRUB SERPL-MCNC: 1.4 MG/DL — HIGH (ref 0.2–1.2)
BUN SERPL-MCNC: 21 MG/DL — SIGNIFICANT CHANGE UP (ref 7–23)
CALCIUM SERPL-MCNC: 8.3 MG/DL — LOW (ref 8.5–10.1)
CHLORIDE SERPL-SCNC: 108 MMOL/L — SIGNIFICANT CHANGE UP (ref 96–108)
CHOLEST SERPL-MCNC: 164 MG/DL — SIGNIFICANT CHANGE UP
CO2 SERPL-SCNC: 23 MMOL/L — SIGNIFICANT CHANGE UP (ref 22–31)
CREAT SERPL-MCNC: 0.96 MG/DL — SIGNIFICANT CHANGE UP (ref 0.5–1.3)
EGFR: 89 ML/MIN/1.73M2 — SIGNIFICANT CHANGE UP
EOSINOPHIL # BLD AUTO: 0.31 K/UL — SIGNIFICANT CHANGE UP (ref 0–0.5)
EOSINOPHIL NFR BLD AUTO: 3.1 % — SIGNIFICANT CHANGE UP (ref 0–6)
ESTIMATED AVERAGE GLUCOSE: 114 MG/DL — SIGNIFICANT CHANGE UP (ref 68–114)
GLUCOSE SERPL-MCNC: 95 MG/DL — SIGNIFICANT CHANGE UP (ref 70–99)
HCT VFR BLD CALC: 51.6 % — HIGH (ref 39–50)
HDLC SERPL-MCNC: 24 MG/DL — LOW
HGB BLD-MCNC: 17.5 G/DL — HIGH (ref 13–17)
IMM GRANULOCYTES NFR BLD AUTO: 0.4 % — SIGNIFICANT CHANGE UP (ref 0–0.9)
LIPID PNL WITH DIRECT LDL SERPL: 116 MG/DL — HIGH
LYMPHOCYTES # BLD AUTO: 2.05 K/UL — SIGNIFICANT CHANGE UP (ref 1–3.3)
LYMPHOCYTES # BLD AUTO: 20.5 % — SIGNIFICANT CHANGE UP (ref 13–44)
MAGNESIUM SERPL-MCNC: 2.1 MG/DL — SIGNIFICANT CHANGE UP (ref 1.6–2.6)
MCHC RBC-ENTMCNC: 28.9 PG — SIGNIFICANT CHANGE UP (ref 27–34)
MCHC RBC-ENTMCNC: 33.9 GM/DL — SIGNIFICANT CHANGE UP (ref 32–36)
MCV RBC AUTO: 85.3 FL — SIGNIFICANT CHANGE UP (ref 80–100)
MONOCYTES # BLD AUTO: 0.85 K/UL — SIGNIFICANT CHANGE UP (ref 0–0.9)
MONOCYTES NFR BLD AUTO: 8.5 % — SIGNIFICANT CHANGE UP (ref 2–14)
NEUTROPHILS # BLD AUTO: 6.66 K/UL — SIGNIFICANT CHANGE UP (ref 1.8–7.4)
NEUTROPHILS NFR BLD AUTO: 66.7 % — SIGNIFICANT CHANGE UP (ref 43–77)
NON HDL CHOLESTEROL: 140 MG/DL — HIGH
NRBC # BLD: 0 /100 WBCS — SIGNIFICANT CHANGE UP (ref 0–0)
PHOSPHATE SERPL-MCNC: 2.4 MG/DL — LOW (ref 2.5–4.5)
PLATELET # BLD AUTO: 240 K/UL — SIGNIFICANT CHANGE UP (ref 150–400)
POTASSIUM SERPL-MCNC: 4 MMOL/L — SIGNIFICANT CHANGE UP (ref 3.5–5.3)
POTASSIUM SERPL-SCNC: 4 MMOL/L — SIGNIFICANT CHANGE UP (ref 3.5–5.3)
PROT SERPL-MCNC: 6.7 G/DL — SIGNIFICANT CHANGE UP (ref 6–8.3)
RBC # BLD: 6.05 M/UL — HIGH (ref 4.2–5.8)
RBC # FLD: 13.4 % — SIGNIFICANT CHANGE UP (ref 10.3–14.5)
SODIUM SERPL-SCNC: 136 MMOL/L — SIGNIFICANT CHANGE UP (ref 135–145)
TRIGL SERPL-MCNC: 131 MG/DL — SIGNIFICANT CHANGE UP
TSH SERPL-MCNC: 2.76 UIU/ML — SIGNIFICANT CHANGE UP (ref 0.36–3.74)
WBC # BLD: 9.99 K/UL — SIGNIFICANT CHANGE UP (ref 3.8–10.5)
WBC # FLD AUTO: 9.99 K/UL — SIGNIFICANT CHANGE UP (ref 3.8–10.5)

## 2023-09-10 PROCEDURE — 99233 SBSQ HOSP IP/OBS HIGH 50: CPT | Mod: GC

## 2023-09-10 RX ORDER — METOPROLOL TARTRATE 50 MG
12.5 TABLET ORAL DAILY
Refills: 0 | Status: DISCONTINUED | OUTPATIENT
Start: 2023-09-10 | End: 2023-09-11

## 2023-09-10 RX ORDER — FUROSEMIDE 40 MG
20 TABLET ORAL ONCE
Refills: 0 | Status: COMPLETED | OUTPATIENT
Start: 2023-09-10 | End: 2023-09-10

## 2023-09-10 RX ORDER — CLOPIDOGREL BISULFATE 75 MG/1
75 TABLET, FILM COATED ORAL DAILY
Refills: 0 | Status: DISCONTINUED | OUTPATIENT
Start: 2023-09-11 | End: 2023-09-11

## 2023-09-10 RX ORDER — SODIUM,POTASSIUM PHOSPHATES 278-250MG
1 POWDER IN PACKET (EA) ORAL ONCE
Refills: 0 | Status: COMPLETED | OUTPATIENT
Start: 2023-09-10 | End: 2023-09-10

## 2023-09-10 RX ORDER — CLOPIDOGREL BISULFATE 75 MG/1
300 TABLET, FILM COATED ORAL ONCE
Refills: 0 | Status: COMPLETED | OUTPATIENT
Start: 2023-09-10 | End: 2023-09-10

## 2023-09-10 RX ADMIN — Medication 1 PATCH: at 11:42

## 2023-09-10 RX ADMIN — TICAGRELOR 90 MILLIGRAM(S): 90 TABLET ORAL at 05:43

## 2023-09-10 RX ADMIN — Medication 1 PACKET(S): at 09:22

## 2023-09-10 RX ADMIN — Medication 1 PATCH: at 11:38

## 2023-09-10 RX ADMIN — ATORVASTATIN CALCIUM 80 MILLIGRAM(S): 80 TABLET, FILM COATED ORAL at 21:31

## 2023-09-10 RX ADMIN — Medication 81 MILLIGRAM(S): at 11:37

## 2023-09-10 RX ADMIN — Medication 1 PATCH: at 07:40

## 2023-09-10 RX ADMIN — Medication 1 PATCH: at 19:14

## 2023-09-10 RX ADMIN — CLOPIDOGREL BISULFATE 300 MILLIGRAM(S): 75 TABLET, FILM COATED ORAL at 17:23

## 2023-09-10 RX ADMIN — Medication 20 MILLIGRAM(S): at 11:37

## 2023-09-10 NOTE — PROGRESS NOTE ADULT - SUBJECTIVE AND OBJECTIVE BOX
Patient is a 62y old  Male who presents with a chief complaint of ST elevation myocardial infarction (STEMI)     (09 Sep 2023 11:03)  medical fu up consult     INTERVAL HPI/OVERNIGHT EVENTS:     T(C): 36.5 (09-10-23 @ 08:04), Max: 37.3 (09-09-23 @ 12:09)  HR: 50 (09-10-23 @ 10:15) (45 - 59)  BP: 101/59 (09-10-23 @ 10:15) (80/52 - 122/75)  RR: 23 (09-10-23 @ 10:15) (16 - 30)  SpO2: 95% (09-10-23 @ 10:15) (89% - 97%)  Wt(kg): --  I&O's Summary    09 Sep 2023 07:01  -  10 Sep 2023 07:00  --------------------------------------------------------  IN: 480 mL / OUT: 2175 mL / NET: -1695 mL    10 Sep 2023 07:01  -  10 Sep 2023 10:57  --------------------------------------------------------  IN: 125 mL / OUT: 0 mL / NET: 125 mL        REVIEW OF SYSTEMS:  CONSTITUTIONAL: No fever, weight loss, or fatigue  EYES: No eye pain, visual disturbances, or discharge  ENMT:  No difficulty hearing, tinnitus, vertigo; No sinus or throat pain  NECK: No pain or stiffness    RESPIRATORY: No cough, wheezing, No shortness of breath  CARDIOVASCULAR: No chest pain, palpitations, dizziness, or leg swelling  GASTROINTESTINAL: No abdominal or epigastric pain. No nausea, vomiting, or hematemesis; No diarrhea or constipation.   GENITOURINARY: No dysuria, frequency, hematuria, or incontinence  NEUROLOGICAL: No headaches, memory loss, loss of strength, numbness, or tremors  SKIN: No itching, burning, rashes, or lesions   MUSCULOSKELETAL: No joint pain or swelling; No muscle, back, or extremity pain    PHYSICAL EXAM:  GENERAL: NAD,   HEAD:  Atraumatic, Normocephalic  EYES: EOMI, PERRLA, conjunctiva and sclera clear  ENMT: No tonsillar erythema, exudates, or enlargement; Moist mucous membranes  NECK: Supple, No JVD  NERVOUS SYSTEM:  Alert & Oriented X3; Motor Strength 5/5 B/L upper and lower extremities; DTRs 2+ intact and symmetric  CHEST/LUNG  percussion bilaterally; No rales, rhonchi, wheezing  HEART:  mild violetta  and rhythm; No murmurs,   ABDOMEN: Soft, Nontender, Nondistended; Bowel sounds present  EXTREMITIES:  2+ Peripheral Pulses, No clubbing, cyanosis, or edema  SKIN: No rashes or lesions    MEDICATIONS  (STANDING):  aspirin enteric coated 81 milliGRAM(s) Oral daily  atorvastatin 80 milliGRAM(s) Oral at bedtime  furosemide    Tablet 20 milliGRAM(s) Oral once  influenza   Vaccine 0.5 milliLiter(s) IntraMuscular once  lisinopril 5 milliGRAM(s) Oral daily  metoprolol succinate ER 12.5 milliGRAM(s) Oral daily  nicotine - 21 mG/24Hr(s) Patch 1 Patch Transdermal daily  ticagrelor 90 milliGRAM(s) Oral every 12 hours    MEDICATIONS  (PRN):      LABS:                        17.5   9.99  )-----------( 240      ( 10 Sep 2023 05:41 )             51.6     09-10    136  |  108  |  21  ----------------------------<  95  4.0   |  23  |  0.96    Ca    8.3<L>      10 Sep 2023 05:41  Phos  2.4     09-10  Mg     2.1     09-10    TPro  6.7  /  Alb  3.1<L>  /  TBili  1.4<H>  /  DBili  x   /  AST  48<H>  /  ALT  36  /  AlkPhos  94  09-10    PT/INR - ( 08 Sep 2023 11:32 )   PT: 10.5 sec;   INR: 0.94 ratio         PTT - ( 08 Sep 2023 11:32 )  PTT:40.4 sec  Urinalysis Basic - ( 10 Sep 2023 05:41 )    Color: x / Appearance: x / SG: x / pH: x  Gluc: 95 mg/dL / Ketone: x  / Bili: x / Urobili: x   Blood: x / Protein: x / Nitrite: x   Leuk Esterase: x / RBC: x / WBC x   Sq Epi: x / Non Sq Epi: x / Bacteria: x                  RADIOLOGY & ADDITIONAL TESTS:    Imaging Personally Reviewed:     no new test   Advance Directives:    full code        Patient is a 62y old  Male who presents with a chief complaint of ST elevation myocardial infarction (STEMI)     (09 Sep 2023 11:03)  medical fu up consult   pt seen and examine today alert awake  , no fever , no cp , no sob.   INTERVAL HPI/OVERNIGHT EVENTS:     T(C): 36.5 (09-10-23 @ 08:04), Max: 37.3 (09-09-23 @ 12:09)  HR: 50 (09-10-23 @ 10:15) (45 - 59)  BP: 101/59 (09-10-23 @ 10:15) (80/52 - 122/75)  RR: 23 (09-10-23 @ 10:15) (16 - 30)  SpO2: 95% (09-10-23 @ 10:15) (89% - 97%)  Wt(kg): --  I&O's Summary    09 Sep 2023 07:01  -  10 Sep 2023 07:00  --------------------------------------------------------  IN: 480 mL / OUT: 2175 mL / NET: -1695 mL    10 Sep 2023 07:01  -  10 Sep 2023 10:57  --------------------------------------------------------  IN: 125 mL / OUT: 0 mL / NET: 125 mL        REVIEW OF SYSTEMS:  CONSTITUTIONAL: No fever, weight loss, or fatigue  EYES: No eye pain, visual disturbances, or discharge  ENMT:  No difficulty hearing, tinnitus, vertigo; No sinus or throat pain  NECK: No pain or stiffness  RESPIRATORY: No cough, wheezing, No shortness of breath  CARDIOVASCULAR: No chest pain, palpitations, dizziness, or leg swelling  GASTROINTESTINAL: No abdominal or epigastric pain. No nausea, vomiting,No diarrhea or constipation.   GENITOURINARY: No dysuria, frequency, hematuria, or incontinence  NEUROLOGICAL: No headaches, memory loss, loss of strength, numbness, or tremors  SKIN: No itching, burning, rashes, or lesions   MUSCULOSKELETAL: No joint pain or swelling; No muscle, back, or extremity pain    PHYSICAL EXAM:  GENERAL: NAD,   HEAD:  Atraumatic, Normocephalic  EYES: EOMI, PERRLA, conjunctiva and sclera clear  ENMT: ; Moist mucous membranes  NECK: Supple, No JVD  NERVOUS SYSTEM:  Alert & Oriented X3; Motor Strength 5/5 B/L upper and lower extremities; DTRs 2+ intact and symmetric  CHEST/LUNG  percussion bilaterally; No rales, rhonchi, wheezing  HEART:  mild violetta  and rhythm; No murmurs,   ABDOMEN: Soft, Nontender, Nondistended; Bowel sounds present  EXTREMITIES:  2+ Peripheral Pulses, No clubbing, cyanosis, or edema  SKIN: No rashes or lesions    MEDICATIONS  (STANDING):  aspirin enteric coated 81 milliGRAM(s) Oral daily  atorvastatin 80 milliGRAM(s) Oral at bedtime  furosemide    Tablet 20 milliGRAM(s) Oral once  influenza   Vaccine 0.5 milliLiter(s) IntraMuscular once  lisinopril 5 milliGRAM(s) Oral daily  metoprolol succinate ER 12.5 milliGRAM(s) Oral daily  nicotine - 21 mG/24Hr(s) Patch 1 Patch Transdermal daily  ticagrelor 90 milliGRAM(s) Oral every 12 hours    MEDICATIONS  (PRN):      LABS:                        17.5   9.99  )-----------( 240      ( 10 Sep 2023 05:41 )             51.6     09-10    136  |  108  |  21  ----------------------------<  95  4.0   |  23  |  0.96    Ca    8.3<L>      10 Sep 2023 05:41  Phos  2.4     09-10  Mg     2.1     09-10    TPro  6.7  /  Alb  3.1<L>  /  TBili  1.4<H>  /  DBili  x   /  AST  48<H>  /  ALT  36  /  AlkPhos  94  09-10    PT/INR - ( 08 Sep 2023 11:32 )   PT: 10.5 sec;   INR: 0.94 ratio         PTT - ( 08 Sep 2023 11:32 )  PTT:40.4 sec  Urinalysis Basic - ( 10 Sep 2023 05:41 )    Color: x / Appearance: x / SG: x / pH: x  Gluc: 95 mg/dL / Ketone: x  / Bili: x / Urobili: x   Blood: x / Protein: x / Nitrite: x   Leuk Esterase: x / RBC: x / WBC x   Sq Epi: x / Non Sq Epi: x / Bacteria: x                  RADIOLOGY & ADDITIONAL TESTS:    Imaging Personally Reviewed:     no new test   Advance Directives:    full code

## 2023-09-10 NOTE — PROGRESS NOTE ADULT - SUBJECTIVE AND OBJECTIVE BOX
INTERVAL HPI/OVERNIGHT EVENTS: Remains bradycardic, HR 45-99    SUBJECTIVE: Seen and examined pt at bedside. States that he continues to feel short of breath at rest, although improved compared to yesterday. Requests lasix as his symptoms improved yesterday following administration.     Review of Systems:  Constitutional: No fever, chills, fatigue  Neuro: No headache, numbness, weakness  Resp: + SOB. No cough, wheezing  CVS: No chest pain, palpitations, leg swelling  GI: No abdominal pain, nausea, vomiting, diarrhea   : No dysuria, frequency, incontinence  Skin: No itching, burning, rashes, or lesions   Msk: No joint pain or swelling  Psych: No depression, anxiety, mood swings    ICU Vital Signs Last 24 Hrs  T(C): 36.5 (10 Sep 2023 08:04), Max: 37.3 (09 Sep 2023 12:09)  T(F): 97.7 (10 Sep 2023 08:04), Max: 99.2 (09 Sep 2023 12:09)  HR: 50 (10 Sep 2023 10:15) (45 - 59)  BP: 101/59 (10 Sep 2023 10:15) (80/52 - 122/75)  BP(mean): 73 (10 Sep 2023 10:15) (61 - 88)  ABP: --  ABP(mean): --  RR: 23 (10 Sep 2023 10:15) (16 - 30)  SpO2: 95% (10 Sep 2023 10:15) (89% - 97%)    O2 Parameters below as of 10 Sep 2023 09:20  Patient On (Oxygen Delivery Method): room air              09-09-23 @ 07:01  -  09-10-23 @ 07:00  --------------------------------------------------------  IN: 480 mL / OUT: 2175 mL / NET: -1695 mL    09-10-23 @ 07:01  -  09-10-23 @ 11:00  --------------------------------------------------------  IN: 125 mL / OUT: 0 mL / NET: 125 mL        CAPILLARY BLOOD GLUCOSE          I&O's Summary    09 Sep 2023 07:01  -  10 Sep 2023 07:00  --------------------------------------------------------  IN: 480 mL / OUT: 2175 mL / NET: -1695 mL    10 Sep 2023 07:01  -  10 Sep 2023 11:00  --------------------------------------------------------  IN: 125 mL / OUT: 0 mL / NET: 125 mL        PHYSICAL EXAM:  General: NAD  Neurology: awake and alert  HEENT: NC/AT  Respiratory: CTA b/l, no rales or rhonchi noted  Cardiovascular: bradycardic, regular rhythm, normal S1S2, no M/R/G  Abdomen: soft, NT/ND, +BS, no palpable masses  Extremities: WWP, no clubbing, cyanosis, or edema  Skin: warm/dry      Meds:    furosemide    Tablet Oral  lisinopril Oral  metoprolol succinate ER Oral    atorvastatin Oral          aspirin enteric coated Oral  ticagrelor Oral          influenza   Vaccine IntraMuscular      nicotine - 21 mG/24Hr(s) Patch Transdermal                            17.5   9.99  )-----------( 240      ( 10 Sep 2023 05:41 )             51.6       09-10    136  |  108  |  21  ----------------------------<  95  4.0   |  23  |  0.96    Ca    8.3<L>      10 Sep 2023 05:41  Phos  2.4     09-10  Mg     2.1     09-10    TPro  6.7  /  Alb  3.1<L>  /  TBili  1.4<H>  /  DBili  x   /  AST  48<H>  /  ALT  36  /  AlkPhos  94  09-10      CARDIAC MARKERS ( 09 Sep 2023 03:38 )  x     / x     / 454 U/L / x     / 61.3 ng/mL  CARDIAC MARKERS ( 08 Sep 2023 21:15 )  x     / x     / 684 U/L / x     / 92.5 ng/mL  CARDIAC MARKERS ( 08 Sep 2023 13:00 )  x     / x     / 497 U/L / x     / 42.1 ng/mL      PT/INR - ( 08 Sep 2023 11:32 )   PT: 10.5 sec;   INR: 0.94 ratio         PTT - ( 08 Sep 2023 11:32 )  PTT:40.4 sec  Urinalysis Basic - ( 10 Sep 2023 05:41 )    Color: x / Appearance: x / SG: x / pH: x  Gluc: 95 mg/dL / Ketone: x  / Bili: x / Urobili: x   Blood: x / Protein: x / Nitrite: x   Leuk Esterase: x / RBC: x / WBC x   Sq Epi: x / Non Sq Epi: x / Bacteria: x      Radiology: no new imaging    Bedside Ultrasound: n/a    Tubes/Lines: peripheral iv      GLOBAL ISSUE/BEST PRACTICE:  Analgesia: N  Sedation: N  HOB elevation: Y  Stress ulcer prophylaxis: N  VTE prophylaxis: SCDs  Glycemic control: N  Nutrition: Diet    CODE STATUS:  FULL CODE

## 2023-09-10 NOTE — DISCHARGE NOTE PROVIDER - NSDCMRMEDTOKEN_GEN_ALL_CORE_FT
aspirin 81 mg oral delayed release tablet: 1 tab(s) orally once a day  aspirin 81 mg oral delayed release tablet: 1 tab(s) orally once a day  atorvastatin 80 mg oral tablet: 1 tab(s) orally once a day (at bedtime)  atorvastatin 80 mg oral tablet: 1 tab(s) orally once a day (at bedtime)  ticagrelor 90 mg oral tablet: 1 tab(s) orally every 12 hours  ticagrelor 90 mg oral tablet: 1 tab(s) orally every 12 hours   aspirin 81 mg oral delayed release tablet: 1 tab(s) orally once a day  atorvastatin 80 mg oral tablet: 1 tab(s) orally once a day (at bedtime)  atorvastatin 80 mg oral tablet: 1 tab(s) orally once a day (at bedtime)  lisinopril 2.5 mg oral tablet: 1 tab(s) orally once a day  nicotine 21 mg/24 hr transdermal film, extended release: 21 milligram(s) transdermal once a day  Plavix 75 mg oral tablet: 1 tab(s) orally once a day   aspirin 81 mg oral delayed release tablet: 1 tab(s) orally once a day  atorvastatin 80 mg oral tablet: 1 tablet with sensor orally once a day  lisinopril 2.5 mg oral tablet: 1 tab(s) orally once a day  nicotine 21 mg/24 hr transdermal film, extended release: 21 milligram(s) transdermal once a day  Plavix 75 mg oral tablet: 1 tab(s) orally once a day

## 2023-09-10 NOTE — PROGRESS NOTE ADULT - SUBJECTIVE AND OBJECTIVE BOX
Chief Complaint: Inferior STEMI    Interval Events: No events overnight.    Review of Systems:  General: No fevers, chills, weight gain  Skin: No rashes, color changes  Cardiovascular: No chest pain, orthopnea  Respiratory: No shortness of breath, cough  Gastrointestinal: No nausea, abdominal pain  Genitourinary: No incontinence, pain with urination  Musculoskeletal: No pain, swelling, decreased range of motion  Neurological: No headache, weakness  Psychiatric: No depression, anxiety  Endocrine: No weight gain, increased thirst  All other systems are comprehensively negative.    Physical Exam:  Vital Signs Last 24 Hrs  T(C): 36.5 (10 Sep 2023 08:04), Max: 37.3 (09 Sep 2023 12:09)  T(F): 97.7 (10 Sep 2023 08:04), Max: 99.2 (09 Sep 2023 12:09)  HR: 48 (10 Sep 2023 08:00) (45 - 59)  BP: 93/55 (10 Sep 2023 08:00) (86/52 - 140/83)  BP(mean): 67 (10 Sep 2023 08:00) (64 - 108)  RR: 24 (10 Sep 2023 08:00) (16 - 30)  SpO2: 94% (10 Sep 2023 08:00) (89% - 96%)  Parameters below as of 09 Sep 2023 23:06  Patient On (Oxygen Delivery Method): nasal cannula  O2 Flow (L/min): 2  General: NAD  HEENT: MMM  Neck: No JVD, no carotid bruit  Lungs: CTAB  CV: RRR, nl S1/S2, no M/R/G  Abdomen: S/NT/ND, +BS  Extremities: No LE edema, no cyanosis  Neuro: AAOx3, non-focal  Skin: No rash    Labs:             09-10    136  |  108  |  21  ----------------------------<  95  4.0   |  23  |  0.96    Ca    8.3<L>      10 Sep 2023 05:41  Phos  2.4     09-10  Mg     2.1     09-10    TPro  6.7  /  Alb  3.1<L>  /  TBili  1.4<H>  /  DBili  x   /  AST  48<H>  /  ALT  36  /  AlkPhos  94  09-10                        17.5   9.99  )-----------( 240      ( 10 Sep 2023 05:41 )             51.6       ECG/Telemetry: Sinus bradycardia

## 2023-09-10 NOTE — PROGRESS NOTE ADULT - ASSESSMENT
63 y/o M active smoker presented to [facility] ED with midsternal chest pain and was transferred to the [facility] s/p asa 324mg, heparin 5000u, brilinta, for urgent PCI. Patient was found to have acute inferior wall STEMI and underwent cardiac cath with placement of 2 REBEKAH to dRCA stents.     Neuro: A&Ox3, mentating well   Cardio: STEMI 2/2 2 REBEKAH to dRCA  - cont asa, brilinta, high intensity statin, ACEi  - Bradycardia- overnight HR 40-50s, cont metoprolol succinate 12.5mg po qd with hold for HR<55   - TTE reviewed EF 45% with inc lv wall thickness  -   Pulm: no active issues  GI: tolerating diet  Renal: renal indices wnl, good UOP/monitor, replete electrolytes PRN  ID: no active issues  Endo: TSH WNL, A1c 5.6%  Heme: VTE ppx-encourage ambulation  Dispo: Full code

## 2023-09-10 NOTE — PROGRESS NOTE ADULT - ASSESSMENT
The patient is a 62 year old male with no known past medical history who presents with chest pain in the setting of inferior STEMI.    Plan:  - Sinus bradycardic to upper 40/lower 50s  - Echo with mildly reduced LV systolic function  - Underwent PCI to RCA  - Continue aspirin 81 mg daily  - Continue ticagrelor 90 mg bid  - Discontinue metoprolol for now. HR has been in upper 40s/lower 50s and has not been receiving medication due to holding parameters. If HR trends above 60, can start metoprolol succinate 12.5 mg daily at that time.  - Continue atorvastatin 80 mg daily  - Continue lisinopril 5 mg daily  - Received furosemide 20 mg PO yesterday for mild shortness of breath. Appears euvolemic on exam today. If shortness of breath recurs, can start furosemide 20 mg PO daily. The patient is a 62 year old male with no known past medical history who presents with chest pain in the setting of inferior STEMI.    Plan:  - Sinus bradycardic to upper 40/lower 50s  - Echo with mildly reduced LV systolic function  - Underwent PCI to RCA  - Continue aspirin 81 mg daily  - Continue ticagrelor 90 mg bid  - Discontinue metoprolol for now. HR has been in upper 40s/lower 50s and has not been receiving medication due to holding parameters. If HR trends above 60, can start metoprolol succinate 12.5 mg daily at that time.  - Continue atorvastatin 80 mg daily  - Continue lisinopril 5 mg daily  - Received furosemide 20 mg PO yesterday for mild shortness of breath. Appears euvolemic on exam today. If shortness of breath recurs, can start furosemide 20 mg PO daily.  - Smoking cessation advised The patient is a 62 year old male with no known past medical history who presents with chest pain in the setting of inferior STEMI.    Plan:  - Sinus bradycardic to upper 40/lower 50s  - Echo with mildly reduced LV systolic function  - Underwent PCI to RCA  - Continue aspirin 81 mg daily  - Transitioned to clopidogrel 75 mg daily  - Discontinue metoprolol for now. HR has been in upper 40s/lower 50s and has not been receiving medication due to holding parameters. If HR trends above 60, can start metoprolol succinate 12.5 mg daily at that time.  - Continue atorvastatin 80 mg daily  - Continue lisinopril 5 mg daily  - Received furosemide 20 mg PO yesterday for mild shortness of breath. Appears euvolemic on exam today. If shortness of breath recurs, can start furosemide 20 mg PO daily.  - Smoking cessation advised

## 2023-09-10 NOTE — DISCHARGE NOTE PROVIDER - HOSPITAL COURSE
HPI:       This is a 62 year old gentleman with unknown PMH ( has not been to a doctor in years) takes no medications, + 1.5 PPD everyday smoker x 40 yrs. presenting to the [facility] ED after experiencing 25 min of ongoing midsternal chest pain while weeding. Pt is a . Pt transported to [facility]"" ED to Cardiac Cath Lab for urgent PCI/ EKG demonstrates an Acute IWMI S-T- elevations in leads 2,3,and AVF with posterior S-T depressions. Pt received  mg po, Heparin 5,000 units IV, and Brilinta 180 mg po in [facility] ED. Pt last ate muffins at 7am this morning. Informed consent obtained prior to procedure.    ASA-2  MALL-2  eGFR-77  Creat-1.09  BRA-0.8% (08 Sep 2023 12:18)      ---  HOSPITAL COURSE: Patient admitted to MICU s/p PCI to RCA w/ REBEKAH x2.      Pt seen and examined on day of discharge. Patient is medically optimized for discharge to home with close outpatient followup.    PHYSICAL EXAM ON DAY OF DISCHARGE:  The patient was seen and examined on the day of discharge. Please see progress note from day of discharge for further information.         ---  CONSULTANTS:     ---  TIME SPENT:  I, the attending physician, was physically present for the key portions of the evaluation and management (E/M) service provided. The total amount of time spent reviewing the hospital notes, laboratory values, imaging findings, assessing/counseling the patient, discussing with consultant physicians, social work, nursing staff was -- minutes    ---  Primary care provider was made aware of plan for discharge:      [  ] NO     [  ] YES   HPI:       This is a 62 year old gentleman with unknown PMH ( has not been to a doctor in years) takes no medications, + 1.5 PPD everyday smoker x 40 yrs. presenting to the [facility] ED after experiencing 25 min of ongoing midsternal chest pain while weeding. Pt is a . Pt transported to [facility] ED to Cardiac Cath Lab for urgent PCI/ EKG demonstrates an Acute IWMI S-T- elevations in leads 2,3,and AVF with posterior S-T depressions. Pt received  mg po, Heparin 5,000 units IV, and Brilinta 180 mg po in [facility] ED. Pt last ate muffins at 7am this morning. Informed consent obtained prior to procedure.    ASA-2  MALL-2  eGFR-77  Creat-1.09  BRA-0.8% (08 Sep 2023 12:18)      ---  HOSPITAL COURSE: 63 y/o M active smoker presented to [facility] ED with midsternal chest pain and was transferred to the [facility] s/p asa 324mg, heparin 5000u, brilinta, for urgent PCI.   admitted  for Patient was found to have acute inferior wall STEMI and underwent cardiac cath with placement of 2 REBEKAH to dRCA stents.   to MICU by  [facility] Cardio dr Em    .     pt  Echo with mildly reduced LV systolic function  no acute chf.  -   Continue aspirin 81 mg daily    switch from Brilinta   to clopidogrel 75 mg daily  because  of sob and bradycardia  ,  continue atorvastatin 80 mg daily, lisinopril 2.5 mg daily  hospital course  pt remain snius violetta HR remains in the upper 40/lower 50s. No beta blocker for now   as per cardio dr gregg irizarry   Will attempt to start metoprolol as outpatient.  - Euvolemic on exam - no further diuretics needed at this time  Pt seen and examined on day of discharge. Patient is medically optimized for discharge to home with close outpatient followup   clear by cardiac team .     medically optimize day of dc 9/11/23  Vital Signs Last 24 Hrs  T(C): 36.6 (11 Sep 2023 08:11), Max: 36.8 (10 Sep 2023 17:00)  T(F): 97.9 (11 Sep 2023 08:11), Max: 98.2 (10 Sep 2023 17:00)  HR: 67 (11 Sep 2023 09:00) (48 - 67)  BP: 113/63 (11 Sep 2023 09:00) (84/57 - 113/63)  BP(mean): 82 (11 Sep 2023 09:00) (65 - 82)  RR: 18 (11 Sep 2023 09:00) (16 - 28)  SpO2: 94% (11 Sep 2023 09:00) (90% - 97%)    Parameters below as of 11 Sep 2023 07:00  Patient On (Oxygen Delivery Method): room air        PHYSICAL EXAM:  GENERAL: NAD,   HEAD:  Atraumatic, Normocephalic  EYES: EOMI, PERRLA, conjunctiva and sclera clear  ENMT: ; Moist mucous membranes  NECK: Supple, No JVD  NERVOUS SYSTEM:  Alert & Oriented X3; Motor Strength 5/5 B/L upper and lower extremities; DTRs 2+ intact and symmetric  CHEST/LUNG  percussion bilaterally; No rales, rhonchi, wheezing  HEART:  mild violetta  and rhythm; No murmurs,   ABDOMEN: Soft, Nontender, Nondistended; Bowel sounds present  EXTREMITIES:  2+ Peripheral Pulses, No clubbing, cyanosis, or edema  SKIN: No rashes or lesions      ---  CONSULTANTS:     ---  TIME SPENT:  I, the attending physician, was physically present for the key portions of the evaluation and management (E/M) service provided. The total amount of time spent reviewing the hospital notes, laboratory values, imaging findings, assessing/counseling the patient, discussing with consultant physicians, social work, nursing staff was -40- minutes

## 2023-09-10 NOTE — PROGRESS NOTE ADULT - SUBJECTIVE AND OBJECTIVE BOX
Patient is a 62y old  Male who presents with a chief complaint of ST elevation myocardial infarction (STEMI) (09 Sep 2023 11:03)    BRIEF HOSPITAL COURSE:   62 year old, active smoker, presented with STEMI s/p cardiac catheterization. Found to have occluded dRCA s/p thromectony and REBEKAH x 2. Feels better. Denies chest pain. Has mild shortness of breath. TTE shows mildly reduced LV systolic function.    Events last 24 hours:   -Dyspnea improved s/p Lasix 20mg POx1. Comfortable and satting well on RA. CP free.  -Bradycardia slightly improved, HR now high 50s.   -R radial hematoma slightly expanding - marked. Sensation/Motor intact and pulses equal b/l.   -Afebrile.     PAST MEDICAL & SURGICAL HISTORY:  No pertinent past medical history    Review of Systems:  CONSTITUTIONAL: No fever, chills, or fatigue  EYES: No eye pain, visual disturbances, or discharge  ENMT:  No difficulty hearing, tinnitus, vertigo; No sinus or throat pain  NECK: No pain or stiffness  RESPIRATORY: No cough, wheezing, chills or hemoptysis; No shortness of breath  CARDIOVASCULAR: No chest pain, palpitations, dizziness, or leg swelling  GASTROINTESTINAL: No abdominal or epigastric pain. No nausea, vomiting, or hematemesis; No diarrhea or constipation. No melena or hematochezia.  GENITOURINARY: No dysuria, frequency, hematuria, or incontinence  NEUROLOGICAL: No headaches, memory loss, loss of strength, numbness, or tremors  SKIN: No itching, burning, rashes, or lesions   MUSCULOSKELETAL: No joint pain or swelling; No muscle, back, or extremity pain  PSYCHIATRIC: No depression, anxiety, mood swings, or difficulty sleeping    Medications:  lisinopril 5 milliGRAM(s) Oral daily  metoprolol succinate ER 12.5 milliGRAM(s) Oral daily  aspirin enteric coated 81 milliGRAM(s) Oral daily  ticagrelor 90 milliGRAM(s) Oral every 12 hours  atorvastatin 80 milliGRAM(s) Oral at bedtime  sodium chloride 0.9%. 500 milliLiter(s) IV Continuous <Continuous>  influenza   Vaccine 0.5 milliLiter(s) IntraMuscular once  nicotine - 21 mG/24Hr(s) Patch 1 Patch Transdermal daily    ICU Vital Signs Last 24 Hrs  T(C): 36.5 (09 Sep 2023 16:57), Max: 37.3 (09 Sep 2023 12:09)  T(F): 97.7 (09 Sep 2023 16:57), Max: 99.2 (09 Sep 2023 12:09)  HR: 58 (09 Sep 2023 19:00) (43 - 59)  BP: 90/52 (09 Sep 2023 19:00) (90/52 - 140/83)  BP(mean): 66 (09 Sep 2023 19:00) (66 - 108)  ABP: --  ABP(mean): --  RR: 25 (09 Sep 2023 19:00) (17 - 29)  SpO2: 93% (09 Sep 2023 19:00) (92% - 96%)  O2 Parameters below as of 09 Sep 2023 12:30  Patient On (Oxygen Delivery Method): room air    I&O's Detail  08 Sep 2023 07:01  -  09 Sep 2023 07:00  --------------------------------------------------------  IN:    Oral Fluid: 750 mL    sodium chloride 0.9%: 300 mL  Total IN: 1050 mL  OUT:    Voided (mL): 1400 mL  Total OUT: 1400 mL  Total NET: -350 mL    09 Sep 2023 07:01  -  09 Sep 2023 20:24  --------------------------------------------------------  IN:    Oral Fluid: 300 mL  Total IN: 300 mL  OUT:    Voided (mL): 1500 mL  Total OUT: 1500 mL  Total NET: -1200 mL    LABS:                      16.9   11.17 )-----------( 245      ( 09 Sep 2023 05:50 )             50.2     09-09  138  |  108  |  20  ----------------------------<  104<H>  4.3   |  20<L>  |  0.89  Ca    8.3<L>      09 Sep 2023 05:50  Phos  1.8     09-09  Mg     2.0     09-09  TPro  6.6  /  Alb  3.1<L>  /  TBili  1.0  /  DBili  x   /  AST  104<H>  /  ALT  40  /  AlkPhos  87  09-09    CARDIAC MARKERS ( 09 Sep 2023 03:38 )  x     / x     / 454 U/L / x     / 61.3 ng/mL  CARDIAC MARKERS ( 08 Sep 2023 21:15 )  x     / x     / 684 U/L / x     / 92.5 ng/mL  CARDIAC MARKERS ( 08 Sep 2023 13:00 )  x     / x     / 497 U/L / x     / 42.1 ng/mL    CAPILLARY BLOOD GLUCOSE    PT/INR - ( 08 Sep 2023 11:32 )   PT: 10.5 sec;   INR: 0.94 ratio     PTT - ( 08 Sep 2023 11:32 )  PTT:40.4 sec    Urinalysis Basic - ( 09 Sep 2023 05:50 )  Color: x / Appearance: x / SG: x / pH: x  Gluc: 104 mg/dL / Ketone: x  / Bili: x / Urobili: x   Blood: x / Protein: x / Nitrite: x   Leuk Esterase: x / RBC: x / WBC x   Sq Epi: x / Non Sq Epi: x / Bacteria: x    CULTURES:    Physical Examination:  General: Alert, oriented, interactive, nonfocal.  HEENT: PERRL.  NECK: Supple.   PULM: Clear to auscultation bilaterally.  CVS: s1/s2.  ABD: Soft, nondistended, nontender, normoactive bowel sounds.  EXT: No edema, nontender. +R radial hematoma, no TTP. Sensation/motor intact. Pulses equal b/l.   SKIN: Warm.    RADIOLOGY:     62 year old, active smoker, presented with STEMI s/p cardiac catheterization. Found to have occluded dRCA s/p thromectony and REBEKAH x 2. Feels better. Denies chest pain. Has mild shortness of breath. TTE shows mildly reduced LV systolic function.  Assessment:  1. STEMI  2. Bradycardia   3. Acute HFrEF    Plan:  NEURO:   -Monitor mental status closely, avoid neurosuppresants. Serial neurologic assessments.     CV:  -Maintain goal MAP >65.   -Keep K~4 and Mg>2 for optimal arrhythmia suppression.  -DAPT. High-intensity Statin.   -Bradycardia improving -started on Metoprolol 12.5mg QD (w/ holding parameters). Lisinopril QD.   -Troponin peaked, downtrending. Serial EKG/Sherrie.   -TTE w/ LVEF 45-50%.   -S/p Lasix 20mg POx1 earlier for dyspnea, now improved. Will redose as necessary.     PULM:   -Satting well on RA, will utilize supplemental O2 PRN to maintain goal SpO2 >88%.   -Incentive spirometry, Chest PT/Pulmonary toilet, HOB >30'.     GI:   -DASH/TLC Diet.     RENAL:   -Renal function currently WNL.  -trend lytes/Scr daily with BMP  -I's and O's, goal UOP 0.5 cc/kg/hr  -renal dose meds and avoid nephrotoxins     ENDO:   -Aggressive glycemic control to limit FS glucose to <180mg/dl. A1c WNL.  -Keep Euthyroid. TSH WNL.     ID:   -Afebrile. No concern for infectious process. Monitor off abx.     HEME:   -DVT ppx.     GOC: Full Code.     Time spent on this patient encounter, which includes documenting this note in the electronic medical record, was >51 minutes including assessing the presenting problems with associated risks, reviewing the medical record to prepare for the encounter, and meeting face to face with the patient to obtain additional history. I have also performed an appropriate physical exam, made interventions listed and ordered and interpreted appropriate diagnostic studies as documented. To improve communication and patient safety, I have coordinated care with the multidisciplinary team including the bedside nurse, appropriate attending of record and consultants as needed. This time is independent of any procedures performed.

## 2023-09-10 NOTE — DISCHARGE NOTE PROVIDER - NSDCQMPLAVIX_CARD_A_CORE
Route: IL Dose Administered (Numbers Only - Mg, G, Mcg, Units, Cc): 10 Lot # (Optional): [id number] Dose Administered (Numbers Only - Mg, G, Mcg, Units, Cc): 0 Bill J-Code: no Total Volume Injected In Cc (Will Not Affected Billing): 0.5 Post-Care Instructions: I reviewed with the patient in detail post-care instructions. Patient understands to keep the injection sites clean and call the clinic if there is any redness, swelling or pain. Detail Level: Detailed Administered By (Optional): Dr. Aggarwal Units: mg Expiration Date (Optional): 01/2024 Medication (1) And Associated J-Code Units: Triamcinolone acetonide, 10mg Procedure Information: Please note that the numeric value listed in the Medication (1) and associated J-code units and Medication (2) and associated J-code units variables are j-code amounts and do not represent either the concentration or the total amount of the medications injected.  I strongly recommend selecting no to the Render J-code information in note question. This will allow your note to be more clear. If you are billing j-codes with your injection codes you need to document the total amount of the medication injected. This amount should match the j-code units. For example, if you are injecting Triamcinolone 40mg as an intramuscular injection you would select 40 for the dose field and mg for the units. This would allow you to document  with 4 units (40mg = 10mg x 4). The total volume is not used to calculate j-codes only the amount of the medication administered. Consent: The risks of the medication were reviewed with the patient. Treatment Number: 1 Yes

## 2023-09-10 NOTE — DISCHARGE NOTE PROVIDER - CARE PROVIDER_API CALL
Jose Foreman  Cardiology  [address], [address]  [place] [address]  Phone: [phone]  Fax: [phone]  Follow Up Time:

## 2023-09-10 NOTE — PROGRESS NOTE ADULT - ASSESSMENT
63 y/o M active smoker presented to [facility] ED with midsternal chest pain and was transferred to the [facility] Lab s/p asa 324mg, heparin 5000u, brilinta, for urgent PCI. Patient was found to have acute inferior wall STEMI and underwent cardiac cath with placement of 2 REBEKAH to dRCA stents.

## 2023-09-10 NOTE — DISCHARGE NOTE PROVIDER - NSDCCPCAREPLAN_GEN_ALL_CORE_FT
PRINCIPAL DISCHARGE DIAGNOSIS  Diagnosis: Acute ST elevation myocardial infarction (STEMI) of inferior wall  Assessment and Plan of Treatment: underwent cardiac cath with placement of 2 REBEKAH to dRCA stents.   - BY DR Joey erazo , PLEASE CONTINUE ALL antiplatlet meds  do not stop any meds  need to  protect your cardiac stent.      SECONDARY DISCHARGE DIAGNOSES  Diagnosis: Tobacco use disorder  Assessment and Plan of Treatment: smoking cessation counseling done / continue nicotine patch.

## 2023-09-10 NOTE — PROGRESS NOTE ADULT - PROBLEM SELECTOR PLAN 1
-- Underwent PCI to RCA  - Echo with mildly reduced LV systolic function- s/p one dose iv Lasix   - Continue aspirin 81 mg daily  - Continue ticagrelor 90 mg bid  -  HR has been in upper 40s/lower  if 50s as per cardio dr escobedo   can be on metoprolol 12.5 mg po qdaily -- Underwent PCI to RCA /2 DESstent   - Echo with mildly reduced LV systolic function- s/p one dose iv Lasix   - Continue aspirin 81 mg daily  - Continue ticagrelor 90 mg bid  -  HR has been in upper 40s/lower  if 50s as per cardio dr escobedo   can be on metoprolol 12.5 mg po qdaily- resumed .

## 2023-09-10 NOTE — DISCHARGE NOTE PROVIDER - NSDCFUSCHEDAPPT_GEN_ALL_CORE_FT
Carol Ann Cook  [facility]  CARDIOLOGY [address]  Scheduled Appointment: 09/15/2023

## 2023-09-10 NOTE — PROGRESS NOTE ADULT - TIME BILLING
pt seen and examine today see above plan -61 y/o M active smoker presented to [place] ED with midsternal chest pain and was transferred to the [facility] s/p asa 324mg, heparin 5000u, brilinta, for urgent PCI. Patient was found to have acute inferior wall STEMI and underwent cardiac cath with placement of 2 REBEKAH to dRCA stents-  continue care as per cardiac team.

## 2023-09-11 ENCOUNTER — TRANSCRIPTION ENCOUNTER (OUTPATIENT)
Age: 63
End: 2023-09-11

## 2023-09-11 VITALS
SYSTOLIC BLOOD PRESSURE: 95 MMHG | DIASTOLIC BLOOD PRESSURE: 56 MMHG | RESPIRATION RATE: 18 BRPM | OXYGEN SATURATION: 98 % | HEART RATE: 60 BPM

## 2023-09-11 LAB
ALBUMIN SERPL ELPH-MCNC: 3.2 G/DL — LOW (ref 3.3–5)
ALP SERPL-CCNC: 97 U/L — SIGNIFICANT CHANGE UP (ref 40–120)
ALT FLD-CCNC: 39 U/L — SIGNIFICANT CHANGE UP (ref 12–78)
ANION GAP SERPL CALC-SCNC: 5 MMOL/L — SIGNIFICANT CHANGE UP (ref 5–17)
AST SERPL-CCNC: 38 U/L — HIGH (ref 15–37)
BASOPHILS # BLD AUTO: 0.06 K/UL — SIGNIFICANT CHANGE UP (ref 0–0.2)
BASOPHILS NFR BLD AUTO: 0.6 % — SIGNIFICANT CHANGE UP (ref 0–2)
BILIRUB SERPL-MCNC: 1.1 MG/DL — SIGNIFICANT CHANGE UP (ref 0.2–1.2)
BUN SERPL-MCNC: 23 MG/DL — SIGNIFICANT CHANGE UP (ref 7–23)
CALCIUM SERPL-MCNC: 8.5 MG/DL — SIGNIFICANT CHANGE UP (ref 8.5–10.1)
CHLORIDE SERPL-SCNC: 107 MMOL/L — SIGNIFICANT CHANGE UP (ref 96–108)
CO2 SERPL-SCNC: 23 MMOL/L — SIGNIFICANT CHANGE UP (ref 22–31)
CREAT SERPL-MCNC: 1 MG/DL — SIGNIFICANT CHANGE UP (ref 0.5–1.3)
EGFR: 85 ML/MIN/1.73M2 — SIGNIFICANT CHANGE UP
EOSINOPHIL # BLD AUTO: 0.3 K/UL — SIGNIFICANT CHANGE UP (ref 0–0.5)
EOSINOPHIL NFR BLD AUTO: 3.1 % — SIGNIFICANT CHANGE UP (ref 0–6)
GLUCOSE SERPL-MCNC: 89 MG/DL — SIGNIFICANT CHANGE UP (ref 70–99)
HCT VFR BLD CALC: 52.8 % — HIGH (ref 39–50)
HGB BLD-MCNC: 17.7 G/DL — HIGH (ref 13–17)
IMM GRANULOCYTES NFR BLD AUTO: 0.3 % — SIGNIFICANT CHANGE UP (ref 0–0.9)
LYMPHOCYTES # BLD AUTO: 2.42 K/UL — SIGNIFICANT CHANGE UP (ref 1–3.3)
LYMPHOCYTES # BLD AUTO: 25 % — SIGNIFICANT CHANGE UP (ref 13–44)
MAGNESIUM SERPL-MCNC: 2 MG/DL — SIGNIFICANT CHANGE UP (ref 1.6–2.6)
MCHC RBC-ENTMCNC: 28.5 PG — SIGNIFICANT CHANGE UP (ref 27–34)
MCHC RBC-ENTMCNC: 33.5 GM/DL — SIGNIFICANT CHANGE UP (ref 32–36)
MCV RBC AUTO: 85.2 FL — SIGNIFICANT CHANGE UP (ref 80–100)
MONOCYTES # BLD AUTO: 0.8 K/UL — SIGNIFICANT CHANGE UP (ref 0–0.9)
MONOCYTES NFR BLD AUTO: 8.3 % — SIGNIFICANT CHANGE UP (ref 2–14)
NEUTROPHILS # BLD AUTO: 6.08 K/UL — SIGNIFICANT CHANGE UP (ref 1.8–7.4)
NEUTROPHILS NFR BLD AUTO: 62.7 % — SIGNIFICANT CHANGE UP (ref 43–77)
NRBC # BLD: 0 /100 WBCS — SIGNIFICANT CHANGE UP (ref 0–0)
PHOSPHATE SERPL-MCNC: 2.8 MG/DL — SIGNIFICANT CHANGE UP (ref 2.5–4.5)
PLATELET # BLD AUTO: 239 K/UL — SIGNIFICANT CHANGE UP (ref 150–400)
POTASSIUM SERPL-MCNC: 4.3 MMOL/L — SIGNIFICANT CHANGE UP (ref 3.5–5.3)
POTASSIUM SERPL-SCNC: 4.3 MMOL/L — SIGNIFICANT CHANGE UP (ref 3.5–5.3)
PROT SERPL-MCNC: 6.8 G/DL — SIGNIFICANT CHANGE UP (ref 6–8.3)
RBC # BLD: 6.2 M/UL — HIGH (ref 4.2–5.8)
RBC # FLD: 13.7 % — SIGNIFICANT CHANGE UP (ref 10.3–14.5)
SODIUM SERPL-SCNC: 135 MMOL/L — SIGNIFICANT CHANGE UP (ref 135–145)
WBC # BLD: 9.69 K/UL — SIGNIFICANT CHANGE UP (ref 3.8–10.5)
WBC # FLD AUTO: 9.69 K/UL — SIGNIFICANT CHANGE UP (ref 3.8–10.5)

## 2023-09-11 PROCEDURE — 82550 ASSAY OF CK (CPK): CPT

## 2023-09-11 PROCEDURE — C1887: CPT

## 2023-09-11 PROCEDURE — C1753: CPT

## 2023-09-11 PROCEDURE — C1757: CPT

## 2023-09-11 PROCEDURE — 92978 ENDOLUMINL IVUS OCT C 1ST: CPT | Mod: RC

## 2023-09-11 PROCEDURE — 80053 COMPREHEN METABOLIC PANEL: CPT

## 2023-09-11 PROCEDURE — 83735 ASSAY OF MAGNESIUM: CPT

## 2023-09-11 PROCEDURE — 80061 LIPID PANEL: CPT

## 2023-09-11 PROCEDURE — 93005 ELECTROCARDIOGRAM TRACING: CPT

## 2023-09-11 PROCEDURE — 84100 ASSAY OF PHOSPHORUS: CPT

## 2023-09-11 PROCEDURE — C1894: CPT

## 2023-09-11 PROCEDURE — 85025 COMPLETE CBC W/AUTO DIFF WBC: CPT

## 2023-09-11 PROCEDURE — 99239 HOSP IP/OBS DSCHRG MGMT >30: CPT | Mod: GC

## 2023-09-11 PROCEDURE — 36415 COLL VENOUS BLD VENIPUNCTURE: CPT

## 2023-09-11 PROCEDURE — 82553 CREATINE MB FRACTION: CPT

## 2023-09-11 PROCEDURE — 99232 SBSQ HOSP IP/OBS MODERATE 35: CPT | Mod: GC

## 2023-09-11 PROCEDURE — C1725: CPT

## 2023-09-11 PROCEDURE — C1769: CPT

## 2023-09-11 PROCEDURE — 93306 TTE W/DOPPLER COMPLETE: CPT

## 2023-09-11 PROCEDURE — 84484 ASSAY OF TROPONIN QUANT: CPT

## 2023-09-11 PROCEDURE — 83036 HEMOGLOBIN GLYCOSYLATED A1C: CPT

## 2023-09-11 PROCEDURE — 84443 ASSAY THYROID STIM HORMONE: CPT

## 2023-09-11 PROCEDURE — C1874: CPT

## 2023-09-11 PROCEDURE — C9606: CPT | Mod: RC

## 2023-09-11 RX ORDER — NICOTINE POLACRILEX 2 MG
21 GUM BUCCAL
Qty: 0 | Refills: 0 | DISCHARGE
Start: 2023-09-11

## 2023-09-11 RX ORDER — ATORVASTATIN CALCIUM 80 MG/1
1 TABLET, FILM COATED ORAL
Qty: 90 | Refills: 3
Start: 2023-09-11 | End: 2024-01-08

## 2023-09-11 RX ORDER — NICOTINE POLACRILEX 2 MG
21 GUM BUCCAL
Qty: 1 | Refills: 0
Start: 2023-09-11 | End: 2023-09-17

## 2023-09-11 RX ORDER — ASPIRIN/CALCIUM CARB/MAGNESIUM 324 MG
1 TABLET ORAL
Qty: 90 | Refills: 3
Start: 2023-09-11 | End: 2024-01-08

## 2023-09-11 RX ORDER — LISINOPRIL 2.5 MG/1
1 TABLET ORAL
Qty: 0 | Refills: 0 | DISCHARGE
Start: 2023-09-11

## 2023-09-11 RX ORDER — LISINOPRIL 2.5 MG/1
1 TABLET ORAL
Qty: 30 | Refills: 3
Start: 2023-09-11 | End: 2024-01-08

## 2023-09-11 RX ORDER — LISINOPRIL 2.5 MG/1
1 TABLET ORAL
Qty: 30 | Refills: 0
Start: 2023-09-11 | End: 2023-10-10

## 2023-09-11 RX ORDER — ATORVASTATIN CALCIUM 80 MG/1
1 TABLET, FILM COATED ORAL
Qty: 0 | Refills: 0 | DISCHARGE
Start: 2023-09-11

## 2023-09-11 RX ORDER — LISINOPRIL 2.5 MG/1
1 TABLET ORAL
Qty: 90 | Refills: 3
Start: 2023-09-11

## 2023-09-11 RX ORDER — CLOPIDOGREL BISULFATE 75 MG/1
1 TABLET, FILM COATED ORAL
Qty: 30 | Refills: 0
Start: 2023-09-11 | End: 2023-10-10

## 2023-09-11 RX ORDER — LISINOPRIL 2.5 MG/1
2.5 TABLET ORAL DAILY
Refills: 0 | Status: DISCONTINUED | OUTPATIENT
Start: 2023-09-11 | End: 2023-09-11

## 2023-09-11 RX ORDER — CLOPIDOGREL BISULFATE 75 MG/1
1 TABLET, FILM COATED ORAL
Qty: 90 | Refills: 3
Start: 2023-09-11 | End: 2024-01-08

## 2023-09-11 RX ADMIN — Medication 1 PATCH: at 08:11

## 2023-09-11 RX ADMIN — Medication 81 MILLIGRAM(S): at 11:13

## 2023-09-11 RX ADMIN — LISINOPRIL 2.5 MILLIGRAM(S): 2.5 TABLET ORAL at 08:10

## 2023-09-11 RX ADMIN — Medication 1 PATCH: at 11:13

## 2023-09-11 RX ADMIN — Medication 1 PATCH: at 11:00

## 2023-09-11 RX ADMIN — CLOPIDOGREL BISULFATE 75 MILLIGRAM(S): 75 TABLET, FILM COATED ORAL at 11:13

## 2023-09-11 NOTE — DISCHARGE NOTE NURSING/CASE MANAGEMENT/SOCIAL WORK - NSDCPEWEB_GEN_ALL_CORE
[facility] for Tobacco Control website --- [email or web address] website --- [email or web address]

## 2023-09-11 NOTE — PHARMACOTHERAPY INTERVENTION NOTE - COMMENTS
Meds to beds requested by provider.    The following prescriptions were filled at [facility]:  - clopidogrel 75 mg QD  - atorvastatin 80 mg QD  - lisinopril 2.5 mg QD  - aspirin 81 mg QD      Medications delivered by pharmacist at bedside and counseled on indication, directions, and side effects.  Patient verbalized understanding and had no further questions Meds to beds requested by provider.    The following prescriptions were filled at [facility]:  - clopidogrel 75 mg QD  - atorvastatin 80 mg QD  - lisinopril 2.5 mg QD  - aspirin 81 mg QD    Medications delivered by pharmacist at bedside and counseled on indication, directions, and side effects. Additionally counseled patient on the importance of tobacco cessation, nicotine patch directions and adverse effects, and available resources.  Patient verbalized understanding and had no further questions

## 2023-09-11 NOTE — DISCHARGE NOTE NURSING/CASE MANAGEMENT/SOCIAL WORK - PATIENT PORTAL LINK FT
You can access the FollowMyHealth Patient Portal offered by [facility] by registering at the following website: [email or web address]. By joining [employer]’s FollowMyHealth portal, you will also be able to view your health information using other applications (apps) compatible with our system.

## 2023-09-11 NOTE — PROGRESS NOTE ADULT - ASSESSMENT
The patient is a 62 year old male with no known past medical history who presents with chest pain in the setting of inferior STEMI.    Plan:  - Echo with mildly reduced LV systolic function  - Underwent PCI to RCA  - Continue aspirin 81 mg daily  - Continue clopidogrel 75 mg daily  - HR remains in the upper 40/lower 50s. No beta blocker for now. Will attempt to start metoprolol as outpatient.  - Continue atorvastatin 80 mg daily  - Continue lisinopril 2.5 mg daily  - Euvolemic on exam - no further diuretics needed at this time  - Smoking cessation advised

## 2023-09-11 NOTE — PROGRESS NOTE ADULT - ATTENDING COMMENTS
62 year old, active smoker, presented with STEMI s/p cardiac catheterization. Found to have occluded dRCA s/p thromectony and REBEKAH x 2. Feels better. Denies chest pain. Has mild shortness of breath, improved. TTE shows mildly reduced LV systolic function.    Continue ASA and Brilinta  Continue atorvastatin 80 mg daily  Continue lisinopril 5 mg daily  Continue metoprolol with holding parameters given bradycardia  Lasix 20 mg PO given x 1 today  Nicotine patch ordered  Cardiology follow up
62M active smoker a/w IWMI s/p emergent PCI with REBEKAH x2 to dRCA, now with no recurrence of chest pain and no complications post-procedure. BB held for bradycardia, to be started as outpatient. Continue ASA, plavix, statin and lisinopril. Cleared by interventional and cardiology for dc today, Follow-up appointments arranged by cardiology.
62 year old, active smoker, presented with STEMI s/p cardiac catheterization. Found to have occluded dRCA s/p thromectony and REBEKAH x 2. Feels better. Denies chest pain. Has mild shortness of breath. TTE shows mildly reduced LV systolic function.    Continue ASA and Brilinta  Continue atorvastatin 80 mg daily  Continue lisinopril 5 mg daily  Continue metoprolol with holding parameters given bradycardia  Lasix 20 mg PO given x 1 today  Nicotine patch ordered  Cardiology follow up

## 2023-09-11 NOTE — PROGRESS NOTE ADULT - SUBJECTIVE AND OBJECTIVE BOX
Chief Complaint: Inferior STEMI    Interval Events: No events overnight.    Review of Systems:  General: No fevers, chills, weight gain  Skin: No rashes, color changes  Cardiovascular: No chest pain, orthopnea  Respiratory: No shortness of breath, cough  Gastrointestinal: No nausea, abdominal pain  Genitourinary: No incontinence, pain with urination  Musculoskeletal: No pain, swelling, decreased range of motion  Neurological: No headache, weakness  Psychiatric: No depression, anxiety  Endocrine: No weight gain, increased thirst  All other systems are comprehensively negative.    Physical Exam:  Vital Signs Last 24 Hrs  T(C): 36.6 (11 Sep 2023 08:11), Max: 36.8 (10 Sep 2023 17:00)  T(F): 97.9 (11 Sep 2023 08:11), Max: 98.2 (10 Sep 2023 17:00)  HR: 59 (11 Sep 2023 08:00) (48 - 62)  BP: 105/67 (11 Sep 2023 08:00) (80/52 - 111/61)  BP(mean): 81 (11 Sep 2023 08:00) (61 - 81)  RR: 20 (11 Sep 2023 08:00) (16 - 28)  SpO2: 96% (11 Sep 2023 08:00) (90% - 97%)  Parameters below as of 11 Sep 2023 07:00  Patient On (Oxygen Delivery Method): room air  General: NAD  HEENT: MMM  Neck: No JVD, no carotid bruit  Lungs: CTAB  CV: RRR, nl S1/S2, no M/R/G  Abdomen: S/NT/ND, +BS  Extremities: No LE edema, no cyanosis  Neuro: AAOx3, non-focal  Skin: No rash    Labs:             09-11    135  |  107  |  23  ----------------------------<  89  4.3   |  23  |  1.00    Ca    8.5      11 Sep 2023 06:11  Phos  2.8     09-11  Mg     2.0     09-11    TPro  6.8  /  Alb  3.2<L>  /  TBili  1.1  /  DBili  x   /  AST  38<H>  /  ALT  39  /  AlkPhos  97  09-11                        17.7   9.69  )-----------( 239      ( 11 Sep 2023 06:11 )             52.8       ECG/Telemetry: Sinus bradycardia

## 2023-09-11 NOTE — PROGRESS NOTE ADULT - ASSESSMENT
63 y/o M active smoker presented to [place] ED with midsternal chest pain and was transferred to the [facility] s/p asa 324mg, heparin 5000u, brilinta, for urgent PCI. Patient was found to have acute inferior wall STEMI and underwent cardiac cath with placement of 2 REBEKAH to dRCA stents.     Neuro: A&Ox3, mentating well     Cardio: STEMI 2/2 2 REBEKAH to dRCA  - cont asa, clopidogrel,  high intensity statin, ACEi  - Bradycardia- overnight HR 40-60s,  - d/c metoprolol succinate given continued bradycardia, re-evaluate outpatient  - TTE reviewed EF 45% with inc lv wall thickness  -     Pulm: no active issues    GI: tolerating diet    Renal: renal indices wnl, good UOP/monitor, replete electrolytes PRN  ID: no active issues    Endo: TSH WNL, A1c 5.6%    Heme: VTE ppx-encourage ambulation    Dispo: Full code

## 2023-09-11 NOTE — PROGRESS NOTE ADULT - SUBJECTIVE AND OBJECTIVE BOX
Department of Cardiology                                                                     Division of Interventional Cardiology                                                                  [facility]/ [address] [place] [address] [place] [address]                                                                             Telephone: [phone]                                                    Post- Procedure Note: s/p STEMI/ s/p Left Heart Cardiac Catheterization / PCI/REBEKAH to RCA via RRA POD # 3       62y  Male is now s/p left heart catheterizationPCI/REBEKAH to RCA via RRA POD # 3     09/11/23-Pt seen and examined this am-denies any c/o-f/u appt given for this fri 9/15/23 for Dr Cook-RRA access site has echymotic tracking ( marked) no further extension since Sat-echymosis resolving  Transitioned to plavix for DAPT secondary to high Brilinta co-pay e-scripts sent. RRA education and instructions provided.    Subjective/ROS:  Denies CP, palpitations, SOB, N/V, fever/chills, dizziness, weakness, numbness/tingling.      HPI:       This is a 62 year old gentleman with unknown PMH ( has not been to a doctor in years) takes no medications, + 1.5 PPD everyday smoker x 40 yrs. presenting to the [place] ED after experiencing 25 min of ongoing midsternal chest pain while weeding. Pt is a . Pt transported to [facility] ED to Cardiac Cath Lab for urgent PCI/ EKG demonstrates an Acute IWMI S-T- elevations in leads 2,3,and AVF with posterior S-T depressions. Pt received  mg po, Heparin 5,000 units IV, and Brilinta 180 mg po in [place] ED. Pt last ate muffins at 7am this morning. Informed consent obtained prior to procedure.    ASA-2  MALL-2  eGFR-77  Creat-1.09  BRA-0.8% (08 Sep 2023 12:18)      PAST MEDICAL & SURGICAL HISTORY:  No pertinent past medical history    MEDICATIONS  (STANDING):  aspirin enteric coated 81 milliGRAM(s) Oral daily  atorvastatin 80 milliGRAM(s) Oral at bedtime  clopidogrel Tablet 75 milliGRAM(s) Oral daily  influenza   Vaccine 0.5 milliLiter(s) IntraMuscular once  lisinopril 2.5 milliGRAM(s) Oral daily  nicotine - 21 mG/24Hr(s) Patch 1 Patch Transdermal daily    MEDICATIONS  (PRN):    Allergies: No Known Allergies                            17.7   9.69  )-----------( 239      ( 11 Sep 2023 06:11 )             52.8     09-11    135  |  107  |  23  ----------------------------<  89  4.3   |  23  |  1.00    Ca    8.5      11 Sep 2023 06:11  Phos  2.8     09-11  Mg     2.0     09-11    TPro  6.8  /  Alb  3.2<L>  /  TBili  1.1  /  DBili  x   /  AST  38<H>  /  ALT  39  /  AlkPhos  97  09-11          Vital Signs Last 24 Hrs  T(C): 36.6 (11 Sep 2023 08:11), Max: 36.8 (10 Sep 2023 17:00)  T(F): 97.9 (11 Sep 2023 08:11), Max: 98.2 (10 Sep 2023 17:00)  HR: 59 (11 Sep 2023 08:00) (48 - 62)  BP: 105/67 (11 Sep 2023 08:00) (80/52 - 111/61)  BP(mean): 81 (11 Sep 2023 08:00) (61 - 81)  RR: 20 (11 Sep 2023 08:00) (16 - 28)  SpO2: 96% (11 Sep 2023 08:00) (90% - 97%)    Parameters below as of 11 Sep 2023 07:00  Patient On (Oxygen Delivery Method): room air        Constitutional: NAD  Neuro: A+O x 3, non-focal, speech clear and intact  HEENT: NC/AT, PERRL, EOMI, anicteric sclerae, oral mucosa pink and moist  Neck: supple, no JVD  CV: regular rate, regular rhythm, +S1S2, no murmurs or rub  Pulm/chest: lung sounds CTA and equal bilaterally, no accessory muscle use noted  Abd: soft, NT, ND, +BS  Ext: KWAN x 4, no C/C/E  Access site: R wrist C/D/I/soft without hematoma, distal motor/neuro/circ intact. R radial pulse 2+.RRA access site has echymotic tracking ( marked) no further extension since Sat-echymosis resolving  Skin: warm, well perfused  Psych: calm, appropriate affect  Tele-NSR-SInus Vito 50s  EKG-Sinus Vito HR 57 No acute changes    RRA education and instructions provided:  Restricted use with no heavy lifting of affected arm for 48 hours.  No submerging the arm in water for 48 hours.  You may start showering today.  Call your doctor for any bleeding, swelling, loss of sensation in the hand or fingers, or fingers turning blue.  If heavy bleeding or large lumps form, hold pressure at the spot and come to the Emergency Room.

## 2023-09-11 NOTE — PROGRESS NOTE ADULT - PROVIDER SPECIALTY LIST ADULT
Cardiology
Critical Care
Critical Care
Cardiology
Critical Care
Critical Care
Cardiology
Critical Care
Intervent Cardiology
Intervent Cardiology
Hospitalist

## 2023-09-11 NOTE — PROGRESS NOTE ADULT - SUBJECTIVE AND OBJECTIVE BOX
INTERVAL HPI/OVERNIGHT EVENTS: Remains bradycardic    SUBJECTIVE: Seen and examined pt at bedside.  Patient states his SOB has resolved since yesterday.  He denies experiencing any chest pain.  Patient is eating well.  Patient is ambulating appropriately throughout the ICU. Patient's only concern is whether he could receive a flu vaccine this season.      Review of Systems:  Constitutional: No fever, chills, fatigue  Neuro: No headache, numbness, weakness  Resp: no SOB. cough, wheezing  CVS: No chest pain, palpitations, leg swelling  GI: No abdominal pain, nausea, vomiting, diarrhea   : No dysuria, frequency, incontinence  Skin: No itching, burning, rashes, or lesions   Msk: No joint pain or swelling  Psych: No depression, anxiety, mood swings    ICU Vital Signs Last 24 Hrs  T(C): 36.5 (10 Sep 2023 08:04), Max: 37.3 (09 Sep 2023 12:09)  T(F): 97.7 (10 Sep 2023 08:04), Max: 99.2 (09 Sep 2023 12:09)  HR: 50 (10 Sep 2023 10:15) (45 - 59)  BP: 101/59 (10 Sep 2023 10:15) (80/52 - 122/75)  BP(mean): 73 (10 Sep 2023 10:15) (61 - 88)  ABP: --  ABP(mean): --  RR: 23 (10 Sep 2023 10:15) (16 - 30)  SpO2: 95% (10 Sep 2023 10:15) (89% - 97%)    O2 Parameters below as of 10 Sep 2023 09:20  Patient On (Oxygen Delivery Method): room air              09-09-23 @ 07:01  -  09-10-23 @ 07:00  --------------------------------------------------------  IN: 480 mL / OUT: 2175 mL / NET: -1695 mL    09-10-23 @ 07:01  -  09-10-23 @ 11:00  --------------------------------------------------------  IN: 125 mL / OUT: 0 mL / NET: 125 mL        CAPILLARY BLOOD GLUCOSE          I&O's Summary    09 Sep 2023 07:01  -  10 Sep 2023 07:00  --------------------------------------------------------  IN: 480 mL / OUT: 2175 mL / NET: -1695 mL    10 Sep 2023 07:01  -  10 Sep 2023 11:00  --------------------------------------------------------  IN: 125 mL / OUT: 0 mL / NET: 125 mL          GENERAL: patient appears well, no acute distress, appropriate, pleasant  EYES: sclera clear, no exudates  ENMT: oropharynx clear without erythema, no exudates, moist mucous membranes  NECK: supple, soft, no thyromegaly noted  LUNGS: good air entry bilaterally, clear to auscultation, symmetric breath sounds, no wheezing or rhonchi appreciated  HEART: soft S1/S2, regular rate and rhythm, no murmurs noted, no lower extremity edema  GASTROINTESTINAL: abdomen is soft, nontender, nondistended, normoactive bowel sounds, no palpable masses  INTEGUMENT: good skin turgor, no lesions noted, mild echymosis on right forearm, clean catheter site  MUSCULOSKELETAL: no clubbing or cyanosis, no obvious deformity  NEUROLOGIC: awake, alert, oriented x3, good muscle tone in 4 extremities, no obvious sensory deficits  PSYCHIATRIC: mood is good, affect is congruent, linear and logical thought process  HEME/LYMPH: no palpable supraclavicular nodules, no obvious ecchymosis or petechiae     Meds:    furosemide    Tablet Oral  lisinopril Oral  metoprolol succinate ER Oral    atorvastatin Oral          aspirin enteric coated Oral  ticagrelor Oral          influenza   Vaccine IntraMuscular      nicotine - 21 mG/24Hr(s) Patch Transdermal                            17.5   9.99  )-----------( 240      ( 10 Sep 2023 05:41 )             51.6       09-10    136  |  108  |  21  ----------------------------<  95  4.0   |  23  |  0.96    Ca    8.3<L>      10 Sep 2023 05:41  Phos  2.4     09-10  Mg     2.1     09-10    TPro  6.7  /  Alb  3.1<L>  /  TBili  1.4<H>  /  DBili  x   /  AST  48<H>  /  ALT  36  /  AlkPhos  94  09-10      CARDIAC MARKERS ( 09 Sep 2023 03:38 )  x     / x     / 454 U/L / x     / 61.3 ng/mL  CARDIAC MARKERS ( 08 Sep 2023 21:15 )  x     / x     / 684 U/L / x     / 92.5 ng/mL  CARDIAC MARKERS ( 08 Sep 2023 13:00 )  x     / x     / 497 U/L / x     / 42.1 ng/mL      PT/INR - ( 08 Sep 2023 11:32 )   PT: 10.5 sec;   INR: 0.94 ratio         PTT - ( 08 Sep 2023 11:32 )  PTT:40.4 sec  Urinalysis Basic - ( 10 Sep 2023 05:41 )    Color: x / Appearance: x / SG: x / pH: x  Gluc: 95 mg/dL / Ketone: x  / Bili: x / Urobili: x   Blood: x / Protein: x / Nitrite: x   Leuk Esterase: x / RBC: x / WBC x   Sq Epi: x / Non Sq Epi: x / Bacteria: x      Radiology: no new imaging    Bedside Ultrasound: n/a    Tubes/Lines: peripheral iv      GLOBAL ISSUE/BEST PRACTICE:  Analgesia: N  Sedation: N  HOB elevation: Y  Stress ulcer prophylaxis: N  VTE prophylaxis: SCDs  Glycemic control: N  Nutrition: Diet    CODE STATUS:  FULL CODE

## 2023-09-11 NOTE — DISCHARGE NOTE NURSING/CASE MANAGEMENT/SOCIAL WORK - NSDCPEEMAIL_GEN_ALL_CORE
[facility] for Tobacco Control email [email or web address]

## 2023-09-11 NOTE — PROGRESS NOTE ADULT - ASSESSMENT
s/p STEMI/ s/p Left Heart Cardiac Catheterization / PCI/REBEKAH to RCA via RRA POD # 3     Pt is already in-patient, on ICU   OOB as tolerated  post cath/PCI routine VS, access site, neuro-vascular monitoring and RUE  post access precautions ordered-site with resolving echymosis  continue dual anti platelet therapy with aspirin AND clopidogrel -e-scripts sent  Pt education provided/reinforced re: importance of strict adherence to uninterrupted DAPT for minimum of 3-12 months (cardiologist will determine duration)  continue high intensity statin,   BB on hold secondary to persistent bradycardia in the 40s-Dr Jose Foreman will evaluate as outpt.  Nicotine patch insitu-smoking cessation counseling and referrals provided  Lifestyle modifications discussed to reduce cardiovascular risk factors including weight reduction, smoking cessation (referral provided if applicable), medication compliance, and routine follow up with Cardiologist to track your BMI, cholesterol, and glucose levels.   Discharge this am via Hospitalist Team  follow-up on 9/15 @ 10:15am with Dr Cook for post PCI check  follow-up in 2 weeks with outpatient/referring cardiologist/ Dr Jose Foreman  cardiac rehab info provided/referral and communication to cardiac rehab in progress- to be completed this Fri as outpt with Dr Cook in office  continue home medication regimen as appropriate  remainder of plan per primary team

## 2023-09-15 ENCOUNTER — APPOINTMENT (OUTPATIENT)
Dept: CARDIOLOGY | Facility: CLINIC | Age: 63
End: 2023-09-15
Payer: COMMERCIAL

## 2023-09-15 ENCOUNTER — NON-APPOINTMENT (OUTPATIENT)
Age: 63
End: 2023-09-15

## 2023-09-15 VITALS
BODY MASS INDEX: 27.59 KG/M2 | HEART RATE: 90 BPM | TEMPERATURE: 97.3 F | DIASTOLIC BLOOD PRESSURE: 69 MMHG | OXYGEN SATURATION: 100 % | SYSTOLIC BLOOD PRESSURE: 118 MMHG | HEIGHT: 74 IN | WEIGHT: 215 LBS

## 2023-09-15 DIAGNOSIS — E78.2 MIXED HYPERLIPIDEMIA: ICD-10-CM

## 2023-09-15 DIAGNOSIS — I21.11 ST ELEVATION (STEMI) MYOCARDIAL INFARCTION INVOLVING RIGHT CORONARY ARTERY: ICD-10-CM

## 2023-09-15 PROBLEM — Z00.00 ENCOUNTER FOR PREVENTIVE HEALTH EXAMINATION: Status: ACTIVE | Noted: 2023-09-15

## 2023-09-15 PROCEDURE — 99214 OFFICE O/P EST MOD 30 MIN: CPT

## 2023-09-15 PROCEDURE — 93000 ELECTROCARDIOGRAM COMPLETE: CPT

## 2023-09-15 RX ORDER — NICOTINE 21 MG/24HR
21 PATCH, TRANSDERMAL 24 HOURS TRANSDERMAL DAILY
Qty: 30 | Refills: 0 | Status: ACTIVE | COMMUNITY
Start: 2023-09-15 | End: 1900-01-01

## 2023-09-17 PROBLEM — E78.2 MIXED HYPERLIPIDEMIA: Status: ACTIVE | Noted: 2023-09-17

## 2023-09-17 PROBLEM — I21.11 ST ELEVATION MYOCARDIAL INFARCTION INVOLVING RIGHT CORONARY ARTERY: Status: ACTIVE | Noted: 2023-09-17

## 2023-09-18 ENCOUNTER — NON-APPOINTMENT (OUTPATIENT)
Age: 63
End: 2023-09-18

## 2023-10-01 RX ORDER — ASPIRIN/CALCIUM CARB/MAGNESIUM 324 MG
1 TABLET ORAL
Qty: 90 | Refills: 3
Start: 2023-10-01 | End: 2024-09-24

## 2023-10-01 RX ORDER — ATORVASTATIN CALCIUM 80 MG/1
1 TABLET, FILM COATED ORAL
Qty: 90 | Refills: 3
Start: 2023-10-01 | End: 2024-09-24

## 2023-10-01 RX ORDER — TICAGRELOR 90 MG/1
1 TABLET ORAL
Qty: 180 | Refills: 3
Start: 2023-10-01 | End: 2024-09-24

## 2024-03-13 NOTE — DISCHARGE NOTE PROVIDER - NS AS DC PROVIDER CONTACT Y/N MULTI
PAST SURGICAL HISTORY:  H/O colonoscopy     H/O endoscopy     H/O ileostomy     H/O: hysterectomy     History of appendectomy     History of ectopic pregnancy     Port-A-Cath in place     
Yes
